# Patient Record
Sex: MALE | Race: WHITE | HISPANIC OR LATINO | URBAN - METROPOLITAN AREA
[De-identification: names, ages, dates, MRNs, and addresses within clinical notes are randomized per-mention and may not be internally consistent; named-entity substitution may affect disease eponyms.]

---

## 2024-07-18 ENCOUNTER — APPOINTMENT (OUTPATIENT)
Dept: PRIMARY CARE | Facility: CLINIC | Age: 34
End: 2024-07-18
Payer: COMMERCIAL

## 2024-07-18 VITALS
SYSTOLIC BLOOD PRESSURE: 101 MMHG | RESPIRATION RATE: 14 BRPM | TEMPERATURE: 96.6 F | BODY MASS INDEX: 14.28 KG/M2 | DIASTOLIC BLOOD PRESSURE: 66 MMHG | HEART RATE: 64 BPM | WEIGHT: 102 LBS | HEIGHT: 71 IN

## 2024-07-18 DIAGNOSIS — Z00.00 ANNUAL PHYSICAL EXAM: ICD-10-CM

## 2024-07-18 DIAGNOSIS — Z11.59 NEED FOR HEPATITIS C SCREENING TEST: ICD-10-CM

## 2024-07-18 DIAGNOSIS — Z11.3 SCREENING FOR STD (SEXUALLY TRANSMITTED DISEASE): ICD-10-CM

## 2024-07-18 DIAGNOSIS — I10 HYPERTENSION, UNSPECIFIED TYPE: Primary | ICD-10-CM

## 2024-07-18 PROCEDURE — 3008F BODY MASS INDEX DOCD: CPT

## 2024-07-18 PROCEDURE — 99395 PREV VISIT EST AGE 18-39: CPT

## 2024-07-18 PROCEDURE — 99203 OFFICE O/P NEW LOW 30 MIN: CPT

## 2024-07-18 PROCEDURE — 3074F SYST BP LT 130 MM HG: CPT

## 2024-07-18 PROCEDURE — 1036F TOBACCO NON-USER: CPT

## 2024-07-18 PROCEDURE — 3078F DIAST BP <80 MM HG: CPT

## 2024-07-18 RX ORDER — METOPROLOL SUCCINATE 25 MG/1
25 TABLET, EXTENDED RELEASE ORAL 2 TIMES DAILY
Qty: 180 TABLET | Refills: 1 | Status: SHIPPED | OUTPATIENT
Start: 2024-07-18

## 2024-07-18 RX ORDER — QUETIAPINE FUMARATE 50 MG/1
50 TABLET, FILM COATED ORAL NIGHTLY
COMMUNITY
Start: 2024-07-07

## 2024-07-18 RX ORDER — BISMUTH SUBSALICYLATE 262 MG
1 TABLET,CHEWABLE ORAL DAILY
COMMUNITY

## 2024-07-18 RX ORDER — CARBAMAZEPINE 200 MG/1
200 TABLET ORAL 3 TIMES DAILY
COMMUNITY

## 2024-07-18 RX ORDER — MIRTAZAPINE 15 MG/1
15 TABLET, FILM COATED ORAL NIGHTLY
COMMUNITY

## 2024-07-18 RX ORDER — LISINOPRIL 5 MG/1
1 TABLET ORAL
COMMUNITY
Start: 2023-09-26

## 2024-07-18 RX ORDER — THIAMINE HCL 50 MG
50 TABLET ORAL DAILY
COMMUNITY

## 2024-07-18 RX ORDER — METOPROLOL SUCCINATE 25 MG/1
25 TABLET, EXTENDED RELEASE ORAL 2 TIMES DAILY
COMMUNITY
End: 2024-07-18 | Stop reason: SDUPTHER

## 2024-07-18 SDOH — ECONOMIC STABILITY: FOOD INSECURITY: WITHIN THE PAST 12 MONTHS, THE FOOD YOU BOUGHT JUST DIDN'T LAST AND YOU DIDN'T HAVE MONEY TO GET MORE.: NEVER TRUE

## 2024-07-18 SDOH — ECONOMIC STABILITY: FOOD INSECURITY: WITHIN THE PAST 12 MONTHS, YOU WORRIED THAT YOUR FOOD WOULD RUN OUT BEFORE YOU GOT MONEY TO BUY MORE.: NEVER TRUE

## 2024-07-18 ASSESSMENT — ENCOUNTER SYMPTOMS
CONSTITUTIONAL NEGATIVE: 1
EYES NEGATIVE: 1
RESPIRATORY NEGATIVE: 1
HEMATOLOGIC/LYMPHATIC NEGATIVE: 1
PSYCHIATRIC NEGATIVE: 1
CARDIOVASCULAR NEGATIVE: 1
GASTROINTESTINAL NEGATIVE: 1
ALLERGIC/IMMUNOLOGIC NEGATIVE: 1
NEUROLOGICAL NEGATIVE: 1
ENDOCRINE NEGATIVE: 1
MUSCULOSKELETAL NEGATIVE: 1

## 2024-07-18 ASSESSMENT — PAIN SCALES - GENERAL: PAINLEVEL: 0-NO PAIN

## 2024-07-18 ASSESSMENT — PATIENT HEALTH QUESTIONNAIRE - PHQ9
2. FEELING DOWN, DEPRESSED OR HOPELESS: SEVERAL DAYS
10. IF YOU CHECKED OFF ANY PROBLEMS, HOW DIFFICULT HAVE THESE PROBLEMS MADE IT FOR YOU TO DO YOUR WORK, TAKE CARE OF THINGS AT HOME, OR GET ALONG WITH OTHER PEOPLE: NOT DIFFICULT AT ALL
SUM OF ALL RESPONSES TO PHQ9 QUESTIONS 1 & 2: 2
1. LITTLE INTEREST OR PLEASURE IN DOING THINGS: SEVERAL DAYS

## 2024-07-18 ASSESSMENT — LIFESTYLE VARIABLES
AUDIT-C TOTAL SCORE: 0
HOW MANY STANDARD DRINKS CONTAINING ALCOHOL DO YOU HAVE ON A TYPICAL DAY: PATIENT DOES NOT DRINK
SKIP TO QUESTIONS 9-10: 1
HOW OFTEN DO YOU HAVE A DRINK CONTAINING ALCOHOL: NEVER
HOW OFTEN DO YOU HAVE SIX OR MORE DRINKS ON ONE OCCASION: NEVER

## 2024-07-18 NOTE — PROGRESS NOTES
"Subjective   Patient ID: Jeremi Monroy is a 34 y.o. male who presents for New Patient Visit and Annual Exam.    HPI   Jeremi presents to establish care and for annual exam. He has no health concerns today.  Eats well and exercises most days during the week  Sees psychiatrist for any concerns of anxiety and depression.  He is originally from [place], and endorses that his health was poor for quite some time. He states that he used to drink heavily for many years and was in and out of the hospital and rehab, and states that he was even on hospice at one time. He has been sober for over 2 years and has been working to improve his health. He was following with a cardiologist when he lived in [place] and would like to become established with one here.   Routine lab work ordered during today's visit.    Review of Systems   Constitutional: Negative.    HENT: Negative.     Eyes: Negative.    Respiratory: Negative.     Cardiovascular: Negative.    Gastrointestinal: Negative.    Endocrine: Negative.    Genitourinary: Negative.    Musculoskeletal: Negative.    Skin: Negative.    Allergic/Immunologic: Negative.    Neurological: Negative.    Hematological: Negative.    Psychiatric/Behavioral: Negative.           Objective   /66 (BP Location: Right arm, Patient Position: Sitting, BP Cuff Size: Adult)   Pulse 64   Temp 35.9 °C (96.6 °F) (Temporal)   Resp 14   Ht 1.803 m (5' 11\")   Wt 46.3 kg (102 lb)   BMI 14.23 kg/m²     Physical Exam  HENT:      Right Ear: Tympanic membrane normal.      Left Ear: Tympanic membrane normal.   Cardiovascular:      Rate and Rhythm: Normal rate and regular rhythm.      Pulses: Normal pulses.      Heart sounds: Normal heart sounds.   Pulmonary:      Effort: Pulmonary effort is normal.      Breath sounds: Normal breath sounds.   Musculoskeletal:         General: Normal range of motion.   Skin:     General: Skin is warm and dry.      Capillary Refill: Capillary refill takes less than 2 " seconds.   Neurological:      Mental Status: He is oriented to person, place, and time.   Psychiatric:         Mood and Affect: Mood normal.         Behavior: Behavior normal.         Thought Content: Thought content normal.         Judgment: Judgment normal.           Assessment/Plan   Problem List Items Addressed This Visit             ICD-10-CM    Annual physical exam Z00.00    Relevant Orders    CBC    Comprehensive Metabolic Panel    TSH with reflex to Free T4 if abnormal    Lipid Panel    Hypertension - Primary I10    Relevant Medications    metoprolol succinate XL (Toprol-XL) 25 mg 24 hr tablet    Other Relevant Orders    Referral to Cardiology     Other Visit Diagnoses         Codes    Screening for STD (sexually transmitted disease)     Z11.3    Relevant Orders    HIV 1/2 Antigen/Antibody Screen with Reflex to Confirmation    Need for hepatitis C screening test     Z11.59    Relevant Orders    Hepatitis C Antibody

## 2024-07-18 NOTE — PATIENT INSTRUCTIONS
Thank you for coming to see me today.  If you have any questions or concerns following our visit, please contact the office.  Phone: [phone]    Follow up with me in 6 months    1)  Get fasting labwork completed.  The lab is down the [place] from our office, I will reach out with results    2) I have placed a referral to become established with cardiology

## 2024-07-19 ENCOUNTER — APPOINTMENT (OUTPATIENT)
Dept: PRIMARY CARE | Facility: CLINIC | Age: 34
End: 2024-07-19

## 2024-07-23 ENCOUNTER — LAB (OUTPATIENT)
Dept: LAB | Facility: LAB | Age: 34
End: 2024-07-23
Payer: COMMERCIAL

## 2024-07-23 ENCOUNTER — TELEPHONE (OUTPATIENT)
Dept: PRIMARY CARE | Facility: CLINIC | Age: 34
End: 2024-07-23

## 2024-07-23 DIAGNOSIS — D64.9 NORMOCYTIC ANEMIA: ICD-10-CM

## 2024-07-23 DIAGNOSIS — Z11.59 NEED FOR HEPATITIS C SCREENING TEST: ICD-10-CM

## 2024-07-23 DIAGNOSIS — Z11.3 SCREENING FOR STD (SEXUALLY TRANSMITTED DISEASE): ICD-10-CM

## 2024-07-23 DIAGNOSIS — E87.1 HYPONATREMIA: Primary | ICD-10-CM

## 2024-07-23 DIAGNOSIS — Z00.00 ANNUAL PHYSICAL EXAM: ICD-10-CM

## 2024-07-23 LAB
ALBUMIN SERPL BCP-MCNC: 5 G/DL (ref 3.4–5)
ALP SERPL-CCNC: 118 U/L (ref 33–120)
ALT SERPL W P-5'-P-CCNC: 29 U/L (ref 10–52)
ANION GAP SERPL CALC-SCNC: 10 MMOL/L (ref 10–20)
AST SERPL W P-5'-P-CCNC: 22 U/L (ref 9–39)
BILIRUB SERPL-MCNC: 0.6 MG/DL (ref 0–1.2)
BUN SERPL-MCNC: 12 MG/DL (ref 6–23)
CALCIUM SERPL-MCNC: 9.5 MG/DL (ref 8.6–10.3)
CHLORIDE SERPL-SCNC: 97 MMOL/L (ref 98–107)
CHOLEST SERPL-MCNC: 74 MG/DL (ref 0–199)
CHOLESTEROL/HDL RATIO: 2.6
CO2 SERPL-SCNC: 27 MMOL/L (ref 21–32)
CREAT SERPL-MCNC: 0.65 MG/DL (ref 0.5–1.3)
EGFRCR SERPLBLD CKD-EPI 2021: >90 ML/MIN/1.73M*2
ERYTHROCYTE [DISTWIDTH] IN BLOOD BY AUTOMATED COUNT: 13.5 % (ref 11.5–14.5)
GLUCOSE SERPL-MCNC: 75 MG/DL (ref 74–99)
HCT VFR BLD AUTO: 34.3 % (ref 41–52)
HCV AB SER QL: NONREACTIVE
HDLC SERPL-MCNC: 28.9 MG/DL
HGB BLD-MCNC: 11.6 G/DL (ref 13.5–17.5)
HIV 1+2 AB+HIV1 P24 AG SERPL QL IA: NONREACTIVE
IRON SATN MFR SERPL: 27 % (ref 25–45)
IRON SERPL-MCNC: 94 UG/DL (ref 35–150)
LDLC SERPL CALC-MCNC: 37 MG/DL
MCH RBC QN AUTO: 32.7 PG (ref 26–34)
MCHC RBC AUTO-ENTMCNC: 33.8 G/DL (ref 32–36)
MCV RBC AUTO: 97 FL (ref 80–100)
NON HDL CHOLESTEROL: 45 MG/DL (ref 0–149)
NRBC BLD-RTO: 0 /100 WBCS (ref 0–0)
PLATELET # BLD AUTO: 156 X10*3/UL (ref 150–450)
POTASSIUM SERPL-SCNC: 4.1 MMOL/L (ref 3.5–5.3)
PROT SERPL-MCNC: 8.1 G/DL (ref 6.4–8.2)
RBC # BLD AUTO: 3.55 X10*6/UL (ref 4.5–5.9)
SODIUM SERPL-SCNC: 130 MMOL/L (ref 136–145)
TIBC SERPL-MCNC: 352 UG/DL (ref 240–445)
TRIGL SERPL-MCNC: 42 MG/DL (ref 0–149)
TSH SERPL-ACNC: 3.14 MIU/L (ref 0.44–3.98)
UIBC SERPL-MCNC: 258 UG/DL (ref 110–370)
VLDL: 8 MG/DL (ref 0–40)
WBC # BLD AUTO: 2.1 X10*3/UL (ref 4.4–11.3)

## 2024-07-23 PROCEDURE — 83540 ASSAY OF IRON: CPT

## 2024-07-23 PROCEDURE — 83550 IRON BINDING TEST: CPT

## 2024-07-23 PROCEDURE — 80061 LIPID PANEL: CPT

## 2024-07-23 PROCEDURE — 84443 ASSAY THYROID STIM HORMONE: CPT

## 2024-07-23 PROCEDURE — 36415 COLL VENOUS BLD VENIPUNCTURE: CPT

## 2024-07-23 PROCEDURE — 86803 HEPATITIS C AB TEST: CPT

## 2024-07-23 PROCEDURE — 85027 COMPLETE CBC AUTOMATED: CPT

## 2024-07-23 PROCEDURE — 87389 HIV-1 AG W/HIV-1&-2 AB AG IA: CPT

## 2024-07-23 PROCEDURE — 80053 COMPREHEN METABOLIC PANEL: CPT

## 2024-07-23 NOTE — TELEPHONE ENCOUNTER
KV is out of the office this week so patient's results were forwarded to me. Sodium level is very low. I recommend rechecking on Monday, does not need to fast. Also white blood cell count and hemoglobin were low. I will recheck those as well.  Thanks,  Brit Soares, DO

## 2024-07-23 NOTE — TELEPHONE ENCOUNTER
----- Message from Rosalie Keene sent at 7/23/2024  3:45 PM EDT -----  Regarding: Abnormal Lab Results  Please review these lab results for one of Nancy's patients and advise if it is okay to wait for her to address them when she returns next week.  ----- Message -----  From: Lab, Background User  Sent: 7/23/2024  11:11 AM EDT  To: MICHAEL Yusuf-CNP

## 2024-07-29 ENCOUNTER — LAB (OUTPATIENT)
Dept: LAB | Facility: LAB | Age: 34
End: 2024-07-29
Payer: COMMERCIAL

## 2024-07-29 DIAGNOSIS — E87.1 HYPONATREMIA: ICD-10-CM

## 2024-07-29 DIAGNOSIS — D64.9 NORMOCYTIC ANEMIA: ICD-10-CM

## 2024-07-29 LAB
ANION GAP SERPL CALC-SCNC: 11 MMOL/L (ref 10–20)
BASOPHILS # BLD AUTO: 0.01 X10*3/UL (ref 0–0.1)
BASOPHILS NFR BLD AUTO: 0.4 %
BUN SERPL-MCNC: 13 MG/DL (ref 6–23)
CALCIUM SERPL-MCNC: 8.7 MG/DL (ref 8.6–10.3)
CHLORIDE SERPL-SCNC: 92 MMOL/L (ref 98–107)
CO2 SERPL-SCNC: 28 MMOL/L (ref 21–32)
CREAT SERPL-MCNC: 0.56 MG/DL (ref 0.5–1.3)
EGFRCR SERPLBLD CKD-EPI 2021: >90 ML/MIN/1.73M*2
EOSINOPHIL # BLD AUTO: 0.06 X10*3/UL (ref 0–0.7)
EOSINOPHIL NFR BLD AUTO: 2.1 %
ERYTHROCYTE [DISTWIDTH] IN BLOOD BY AUTOMATED COUNT: 13.4 % (ref 11.5–14.5)
GLUCOSE SERPL-MCNC: 58 MG/DL (ref 74–99)
HCT VFR BLD AUTO: 30 % (ref 41–52)
HGB BLD-MCNC: 10.2 G/DL (ref 13.5–17.5)
IMM GRANULOCYTES # BLD AUTO: 0.01 X10*3/UL (ref 0–0.7)
IMM GRANULOCYTES NFR BLD AUTO: 0.4 % (ref 0–0.9)
LYMPHOCYTES # BLD AUTO: 0.56 X10*3/UL (ref 1.2–4.8)
LYMPHOCYTES NFR BLD AUTO: 20 %
MCH RBC QN AUTO: 33.2 PG (ref 26–34)
MCHC RBC AUTO-ENTMCNC: 34 G/DL (ref 32–36)
MCV RBC AUTO: 98 FL (ref 80–100)
MONOCYTES # BLD AUTO: 0.44 X10*3/UL (ref 0.1–1)
MONOCYTES NFR BLD AUTO: 15.7 %
NEUTROPHILS # BLD AUTO: 1.72 X10*3/UL (ref 1.2–7.7)
NEUTROPHILS NFR BLD AUTO: 61.4 %
NRBC BLD-RTO: 0 /100 WBCS (ref 0–0)
OSMOLALITY SERPL: 267 MOSM/KG (ref 280–300)
PLATELET # BLD AUTO: 145 X10*3/UL (ref 150–450)
POTASSIUM SERPL-SCNC: 3.8 MMOL/L (ref 3.5–5.3)
RBC # BLD AUTO: 3.07 X10*6/UL (ref 4.5–5.9)
SODIUM SERPL-SCNC: 127 MMOL/L (ref 136–145)
WBC # BLD AUTO: 2.8 X10*3/UL (ref 4.4–11.3)

## 2024-07-29 PROCEDURE — 80048 BASIC METABOLIC PNL TOTAL CA: CPT

## 2024-07-29 PROCEDURE — 36415 COLL VENOUS BLD VENIPUNCTURE: CPT

## 2024-07-29 PROCEDURE — 83930 ASSAY OF BLOOD OSMOLALITY: CPT

## 2024-07-29 PROCEDURE — 85025 COMPLETE CBC W/AUTO DIFF WBC: CPT

## 2024-09-20 ENCOUNTER — APPOINTMENT (OUTPATIENT)
Dept: CARDIOLOGY | Facility: HOSPITAL | Age: 34
End: 2024-09-20
Payer: COMMERCIAL

## 2024-09-20 VITALS
WEIGHT: 99 LBS | SYSTOLIC BLOOD PRESSURE: 99 MMHG | HEIGHT: 71 IN | HEART RATE: 60 BPM | DIASTOLIC BLOOD PRESSURE: 60 MMHG | BODY MASS INDEX: 13.86 KG/M2

## 2024-09-20 DIAGNOSIS — R79.89 ABNORMAL LIVER FUNCTION TEST: ICD-10-CM

## 2024-09-20 DIAGNOSIS — E87.1 HYPONATREMIA: ICD-10-CM

## 2024-09-20 DIAGNOSIS — I42.6 ALCOHOLIC CARDIOMYOPATHY (MULTI): Primary | ICD-10-CM

## 2024-09-20 DIAGNOSIS — I10 HYPERTENSION, UNSPECIFIED TYPE: ICD-10-CM

## 2024-09-20 PROCEDURE — 3074F SYST BP LT 130 MM HG: CPT | Performed by: INTERNAL MEDICINE

## 2024-09-20 PROCEDURE — 93010 ELECTROCARDIOGRAM REPORT: CPT | Performed by: INTERNAL MEDICINE

## 2024-09-20 PROCEDURE — 3078F DIAST BP <80 MM HG: CPT | Performed by: INTERNAL MEDICINE

## 2024-09-20 PROCEDURE — 99214 OFFICE O/P EST MOD 30 MIN: CPT | Performed by: INTERNAL MEDICINE

## 2024-09-20 PROCEDURE — 93005 ELECTROCARDIOGRAM TRACING: CPT | Performed by: INTERNAL MEDICINE

## 2024-09-20 PROCEDURE — 3008F BODY MASS INDEX DOCD: CPT | Performed by: INTERNAL MEDICINE

## 2024-09-20 PROCEDURE — 1036F TOBACCO NON-USER: CPT | Performed by: INTERNAL MEDICINE

## 2024-09-20 PROCEDURE — 99204 OFFICE O/P NEW MOD 45 MIN: CPT | Performed by: INTERNAL MEDICINE

## 2024-09-20 RX ORDER — LISINOPRIL 5 MG/1
5 TABLET ORAL
Qty: 90 TABLET | Refills: 1 | Status: SHIPPED | OUTPATIENT
Start: 2024-09-20

## 2024-09-20 RX ORDER — METOPROLOL SUCCINATE 25 MG/1
25 TABLET, EXTENDED RELEASE ORAL 2 TIMES DAILY
Qty: 180 TABLET | Refills: 3 | Status: SHIPPED | OUTPATIENT
Start: 2024-09-20

## 2024-09-20 ASSESSMENT — ENCOUNTER SYMPTOMS
OCCASIONAL FEELINGS OF UNSTEADINESS: 0
LOSS OF SENSATION IN FEET: 0
DEPRESSION: 0

## 2024-09-20 NOTE — PROGRESS NOTES
"Chief Complaint:   New Patient Visit (Wants to est.)     History Of Present Illness:    Jeremi Monroy is a 34 y.o. male presenting to establish care.    He has a past medical history of heavy alcohol use currently in remission for 2 years.  He states that back [place] he was in [place] he had a lot of anxiety and depression and was drinking alcohol heavily would have recurrent seizures, had severe weight loss, weighing only 70 pounds at that time complicated by kidney failure, possibly liver problems as well and congestive heart failure.  However he has more control over his health in the last couple of years and has abstained from alcohol has regained 20 pounds and appetite.  Is functioning very well at this time currently employed as an , happily  and feels well.  The only issues at present are low sodium levels, low BMI and pancytopenia for which he is also planning to follow-up with a hepatologist.  His liver enzyme numbers have improved protein levels are normal but platelet count and other hematologic indicis remain low.    He tells me he has some scarring of the liver but uncertain how much liver damage was done.    He states that his heart function improved after he quit alcohol and with medical therapy.  He has been maintained on lisinopril and metoprolol chronically since.    Today his ECG shows normal sinus rhythm with incomplete right bundle branch block and is within normal limits otherwise..     Last Recorded Vitals:  Vitals:    09/20/24 0933   BP: 99/60   BP Location: Left arm   Pulse: 60   Weight: (!) 44.9 kg (99 lb)   Height: 1.803 m (5' 11\")       Past Medical History:  He has a past medical history of History of ETOH abuse and History of hypertension.    Past Surgical History:  He has no past surgical history on file.      Social History:  He reports that he has never smoked. He has never used smokeless tobacco. He reports that he does not currently use alcohol. He reports that " he does not use drugs.    Family History:  Family History   Problem Relation Name Age of Onset    No Known Problems Mother      Hypertension Father          Allergies:  Patient has no known allergies.    Outpatient Medications:  Current Outpatient Medications   Medication Instructions    carBAMazepine (TEGRETOL) 200 mg, oral, 3 times daily    lisinopril 5 mg, oral, Daily (0630)    metoprolol succinate XL (TOPROL-XL) 25 mg, oral, 2 times daily    mirtazapine (REMERON) 15 mg, oral, Nightly    multivitamin tablet 1 tablet, oral, Daily    QUEtiapine (SEROQUEL) 50 mg, oral, Nightly    thiamine (VITAMIN B-1) 50 mg, oral, Daily       Physical Exam:  Physical Exam  Vitals reviewed.   Constitutional:       Appearance: He is well-groomed and underweight.   Neck:      Vascular: No carotid bruit or JVD.   Cardiovascular:      Rate and Rhythm: Normal rate and regular rhythm.      Heart sounds: Normal heart sounds, S1 normal and S2 normal. No murmur heard.  Pulmonary:      Effort: Pulmonary effort is normal.      Breath sounds: Normal breath sounds.   Abdominal:      General: Abdomen is flat. Bowel sounds are normal.      Palpations: Abdomen is soft.   Musculoskeletal:      Right lower leg: No edema.      Left lower leg: No edema.   Skin:     General: Skin is warm.   Neurological:      Mental Status: He is alert. Mental status is at baseline.   Psychiatric:         Mood and Affect: Mood normal.         Behavior: Behavior normal.           Last Labs:  CBC -  Lab Results   Component Value Date    WBC 2.8 (L) 07/29/2024    HGB 10.2 (L) 07/29/2024    HCT 30.0 (L) 07/29/2024    MCV 98 07/29/2024     (L) 07/29/2024       CMP -  Lab Results   Component Value Date    CALCIUM 8.7 07/29/2024    PROT 8.1 07/23/2024    ALBUMIN 5.0 07/23/2024    AST 22 07/23/2024    ALT 29 07/23/2024    ALKPHOS 118 07/23/2024    BILITOT 0.6 07/23/2024       LIPID PANEL -   Lab Results   Component Value Date    CHOL 74 07/23/2024    TRIG 42 07/23/2024  "   HDL 28.9 07/23/2024    CHHDL 2.6 07/23/2024    VLDL 8 07/23/2024    NHDL 45 07/23/2024       RENAL FUNCTION PANEL -   Lab Results   Component Value Date    GLUCOSE 58 (L) 07/29/2024     (L) 07/29/2024    K 3.8 07/29/2024    CL 92 (L) 07/29/2024    CO2 28 07/29/2024    ANIONGAP 11 07/29/2024    BUN 13 07/29/2024    CREATININE 0.56 07/29/2024    GFRMALE >90 04/19/2022    CALCIUM 8.7 07/29/2024    ALBUMIN 5.0 07/23/2024        No results found for: \"BNP\", \"HGBA1C\"    Last Cardiology Tests:  ECG:  No results found for this or any previous visit from the past 1095 days.      Echo:  No results found for this or any previous visit from the past 1095 days.      Ejection Fractions:  No results found for: \"EF\"    Cath:  No results found for this or any previous visit from the past 1095 days.      Stress Test:  No results found for this or any previous visit from the past 1095 days.      Cardiac Imaging:  No results found for this or any previous visit from the past 1095 days.          Assessment/Plan   1-history of alcoholic cardiomyopathy-likely with recovered LV function after remission from alcohol use.  Continue ACE inhibitors and metoprolol.  Follow-up on echo cardiogram.    Will plan on obtaining records from [place].  Follow-up in 1 year or earlier if needed.    2-prior history of liver damage and abnormal liver enzymes-follow-up with hepatologist for evaluation, will refer.      Yajaira Ellsworth MD  "

## 2024-10-07 ENCOUNTER — HOSPITAL ENCOUNTER (OUTPATIENT)
Dept: CARDIOLOGY | Facility: HOSPITAL | Age: 34
Discharge: HOME | End: 2024-10-07
Payer: COMMERCIAL

## 2024-10-07 DIAGNOSIS — I42.6 ALCOHOLIC CARDIOMYOPATHY (MULTI): ICD-10-CM

## 2024-10-07 LAB
AORTIC VALVE MEAN GRADIENT: 1 MMHG
AORTIC VALVE PEAK VELOCITY: 0.57 M/S
AV PEAK GRADIENT: 1.3 MMHG
AVA (PEAK VEL): 2.63 CM2
AVA (VTI): 2.62 CM2
EJECTION FRACTION: 58 %
LEFT VENTRICLE INTERNAL DIMENSION DIASTOLE: 4.4 CM (ref 3.5–6)
LEFT VENTRICULAR OUTFLOW TRACT DIAMETER: 2.3 CM
MITRAL VALVE E/A RATIO: 1.55
MITRAL VALVE E/E' RATIO: 3.7
RIGHT VENTRICLE FREE WALL PEAK S': 11.6 CM/S
RIGHT VENTRICLE PEAK SYSTOLIC PRESSURE: 12.2 MMHG
TRICUSPID ANNULAR PLANE SYSTOLIC EXCURSION: 2.6 CM

## 2024-10-07 PROCEDURE — 93306 TTE W/DOPPLER COMPLETE: CPT | Performed by: INTERNAL MEDICINE

## 2024-10-07 PROCEDURE — 93306 TTE W/DOPPLER COMPLETE: CPT

## 2024-10-08 ENCOUNTER — TELEPHONE (OUTPATIENT)
Dept: CARDIOLOGY | Facility: HOSPITAL | Age: 34
End: 2024-10-08
Payer: COMMERCIAL

## 2024-10-08 NOTE — TELEPHONE ENCOUNTER
----- Message from Yajaira Ellsworth sent at 10/7/2024  2:54 PM EDT -----  Results reviewed. No critical results, follow up as usual to discuss in details.

## 2024-10-08 NOTE — TELEPHONE ENCOUNTER
Results have been viewed on his MyChart.  Echo looked good.  Called to see if he had any questions.  He does not.  He is happy with results and will follow up next year as scheduled.

## 2024-11-23 ENCOUNTER — OFFICE VISIT (OUTPATIENT)
Dept: URGENT CARE | Age: 34
End: 2024-11-23
Payer: COMMERCIAL

## 2024-11-23 VITALS
DIASTOLIC BLOOD PRESSURE: 67 MMHG | RESPIRATION RATE: 16 BRPM | TEMPERATURE: 97.4 F | SYSTOLIC BLOOD PRESSURE: 99 MMHG | HEART RATE: 68 BPM

## 2024-11-23 DIAGNOSIS — J01.90 ACUTE NON-RECURRENT SINUSITIS, UNSPECIFIED LOCATION: Primary | ICD-10-CM

## 2024-11-23 RX ORDER — AMOXICILLIN AND CLAVULANATE POTASSIUM 875; 125 MG/1; MG/1
1 TABLET, FILM COATED ORAL 2 TIMES DAILY
Qty: 20 TABLET | Refills: 0 | Status: SHIPPED | OUTPATIENT
Start: 2024-11-23 | End: 2024-12-03

## 2024-11-23 ASSESSMENT — ENCOUNTER SYMPTOMS
SHORTNESS OF BREATH: 0
WHEEZING: 0
COUGH: 1
CHEST TIGHTNESS: 0
SORE THROAT: 1
CHILLS: 0
RHINORRHEA: 1
SINUS PRESSURE: 1
FEVER: 0

## 2024-11-23 NOTE — PROGRESS NOTES
Subjective   Patient ID: Jeremi Monroy is a 34 y.o. male. They present today with a chief complaint of Sore Throat and Cough (3 weeks).    History of Present Illness  Patient presents with a 3 week history of upper respiratory symptoms including sore throat, runny nose, congestion, sinus pressure, cough. He denies ear pain, fevers, chills, SOB, chest tightness, wheezing. He has tried OTC Theraflu without relief.         Past Medical History  Allergies as of 11/23/2024   • (No Known Allergies)       (Not in a hospital admission)       Past Medical History:   Diagnosis Date   • History of ETOH abuse    • History of hypertension        No past surgical history on file.     reports that he has never smoked. He has never used smokeless tobacco. He reports that he does not currently use alcohol. He reports that he does not use drugs.    Review of Systems  Review of Systems   Constitutional:  Negative for chills and fever.   HENT:  Positive for congestion, postnasal drip, rhinorrhea, sinus pressure and sore throat. Negative for ear pain.    Respiratory:  Positive for cough. Negative for chest tightness, shortness of breath and wheezing.                                   Objective    There were no vitals filed for this visit.  No LMP for male patient.    Physical Exam  Constitutional:       General: He is not in acute distress.     Appearance: Normal appearance. He is not ill-appearing.   HENT:      Right Ear: Tympanic membrane, ear canal and external ear normal.      Left Ear: Tympanic membrane, ear canal and external ear normal.      Nose: Congestion and rhinorrhea present. Rhinorrhea is purulent.      Right Sinus: Maxillary sinus tenderness present.      Left Sinus: Maxillary sinus tenderness present.      Mouth/Throat:      Pharynx: No pharyngeal swelling, oropharyngeal exudate or posterior oropharyngeal erythema.      Tonsils: No tonsillar exudate or tonsillar abscesses.   Eyes:      General:         Right eye: No  discharge.         Left eye: No discharge.      Conjunctiva/sclera: Conjunctivae normal.   Cardiovascular:      Rate and Rhythm: Normal rate and regular rhythm.      Pulses: Normal pulses.      Heart sounds: Normal heart sounds.   Pulmonary:      Effort: Pulmonary effort is normal. No respiratory distress.      Breath sounds: Normal breath sounds. No wheezing, rhonchi or rales.   Lymphadenopathy:      Cervical: Cervical adenopathy present.   Neurological:      Mental Status: He is alert and oriented to person, place, and time.       Procedures    Point of Care Test & Imaging Results from this visit  No results found for this visit on 11/23/24.   No results found.    Diagnostic study results (if any) were reviewed by [facility].    Assessment/Plan   Allergies, medications, history, and pertinent labs/EKGs/Imaging reviewed by Liyah Marie PA-C.     Medical Decision Making  Patient presents with signs and symptoms consistent with sinusitis. Given length of symptoms and lack of improvement with OTC symptomatic treatment, it seemed reasonable to treat with course of antibiotics. Discussed may continue with OTC symptomatic relief. Discussed to follow up with PCP if symptoms are not improving over the next 3-5 days, earlier with any acute worsening.      Orders and Diagnoses  There are no diagnoses linked to this encounter.    Medical Admin Record      Patient disposition: Home    Electronically signed by [facility]  8:17 AM

## 2024-12-30 ENCOUNTER — TELEPHONE (OUTPATIENT)
Dept: PRIMARY CARE | Facility: CLINIC | Age: 34
End: 2024-12-30
Payer: COMMERCIAL

## 2024-12-30 ENCOUNTER — TELEPHONE (OUTPATIENT)
Dept: HEMATOLOGY/ONCOLOGY | Facility: CLINIC | Age: 34
End: 2024-12-30
Payer: COMMERCIAL

## 2024-12-30 DIAGNOSIS — Z00.00 HEALTHCARE MAINTENANCE: Primary | ICD-10-CM

## 2024-12-30 NOTE — TELEPHONE ENCOUNTER
Just wanted to make sure the order sfor his 1/13/25 appt w/ [facility] will be in a week prior to the visit. He'd like to get them drawn/done before the appt.

## 2025-01-02 ENCOUNTER — LAB (OUTPATIENT)
Dept: LAB | Facility: LAB | Age: 35
End: 2025-01-02
Payer: COMMERCIAL

## 2025-01-02 ENCOUNTER — TELEPHONE (OUTPATIENT)
Dept: PRIMARY CARE | Facility: CLINIC | Age: 35
End: 2025-01-02

## 2025-01-02 DIAGNOSIS — Z00.00 HEALTHCARE MAINTENANCE: ICD-10-CM

## 2025-01-02 LAB
25(OH)D3 SERPL-MCNC: 34 NG/ML (ref 30–100)
ALBUMIN SERPL BCP-MCNC: 3.8 G/DL (ref 3.4–5)
ALP SERPL-CCNC: 126 U/L (ref 33–120)
ALT SERPL W P-5'-P-CCNC: 23 U/L (ref 10–52)
ANION GAP SERPL CALC-SCNC: 10 MMOL/L
AST SERPL W P-5'-P-CCNC: 22 U/L (ref 9–39)
BILIRUB SERPL-MCNC: 0.3 MG/DL (ref 0–1.2)
BUN SERPL-MCNC: 15 MG/DL (ref 6–23)
CALCIUM SERPL-MCNC: 8.2 MG/DL (ref 8.6–10.3)
CHLORIDE SERPL-SCNC: 95 MMOL/L (ref 98–107)
CHOLEST SERPL-MCNC: 64 MG/DL (ref 0–199)
CHOLESTEROL/HDL RATIO: 2.4
CO2 SERPL-SCNC: 27 MMOL/L (ref 21–32)
CREAT SERPL-MCNC: 0.47 MG/DL (ref 0.5–1.3)
EGFRCR SERPLBLD CKD-EPI 2021: >90 ML/MIN/1.73M*2
ERYTHROCYTE [DISTWIDTH] IN BLOOD BY AUTOMATED COUNT: 13.1 % (ref 11.5–14.5)
EST. AVERAGE GLUCOSE BLD GHB EST-MCNC: 100 MG/DL
GLUCOSE SERPL-MCNC: 83 MG/DL (ref 74–99)
HBA1C MFR BLD: 5.1 %
HCT VFR BLD AUTO: 19.4 % (ref 41–52)
HDLC SERPL-MCNC: 26.3 MG/DL
HGB BLD-MCNC: 6.5 G/DL (ref 13.5–17.5)
IRON SATN MFR SERPL: 11 % (ref 25–45)
IRON SERPL-MCNC: 30 UG/DL (ref 35–150)
LDLC SERPL CALC-MCNC: 28 MG/DL
MCH RBC QN AUTO: 34.8 PG (ref 26–34)
MCHC RBC AUTO-ENTMCNC: 33.5 G/DL (ref 32–36)
MCV RBC AUTO: 104 FL (ref 80–100)
NON HDL CHOLESTEROL: 38 MG/DL (ref 0–149)
NRBC BLD-RTO: 0 /100 WBCS (ref 0–0)
PLATELET # BLD AUTO: 173 X10*3/UL (ref 150–450)
POTASSIUM SERPL-SCNC: 3.9 MMOL/L (ref 3.5–5.3)
PROT SERPL-MCNC: 6.1 G/DL (ref 6.4–8.2)
RBC # BLD AUTO: 1.87 X10*6/UL (ref 4.5–5.9)
SODIUM SERPL-SCNC: 128 MMOL/L (ref 136–145)
TIBC SERPL-MCNC: 279 UG/DL (ref 240–445)
TRIGL SERPL-MCNC: 48 MG/DL (ref 0–149)
TSH SERPL-ACNC: 1.92 MIU/L (ref 0.44–3.98)
UIBC SERPL-MCNC: 249 UG/DL (ref 110–370)
VLDL: 10 MG/DL (ref 0–40)
WBC # BLD AUTO: 5 X10*3/UL (ref 4.4–11.3)

## 2025-01-02 PROCEDURE — 80053 COMPREHEN METABOLIC PANEL: CPT

## 2025-01-02 PROCEDURE — 83036 HEMOGLOBIN GLYCOSYLATED A1C: CPT

## 2025-01-02 NOTE — TELEPHONE ENCOUNTER
LAB called with urgent result of Hemoglobin 6.5.    I called and spoke with this Nancy patient and gave him his critical lab result of HEMOGLOBIN: 6.5. I advised him to go to the ER chacorta AUGUSTE, but he states that he felt lightheaded a few days ago when he had the blood drawn but feels ok now. He states that he is eating iron rich foods like red meat and beans and would like to treat this through dietary changes. I let him know that the result was low enough to require a blood transfusion but he still declined. He said that he would strongly consider going to the ER if he starts to feel bad again. I advised him to go to the ER one more time and reiterated the seriousness of the issue , as this could be caused by internal bleeding or other serous problems but he said he understood but wanted to keep an eye on the situation.    This information was forwarded to  Tiffany Keene.

## 2025-01-02 NOTE — TELEPHONE ENCOUNTER
Patient phoned again with questions regarding his conversation with Mike.  He wanted to know if he could schedule a blood transfusion and I told him that he would need to go the ER for an evaluation.  He undertow and had no other questions.

## 2025-01-03 ENCOUNTER — APPOINTMENT (OUTPATIENT)
Dept: RADIOLOGY | Facility: HOSPITAL | Age: 35
End: 2025-01-03
Payer: COMMERCIAL

## 2025-01-03 ENCOUNTER — HOSPITAL ENCOUNTER (EMERGENCY)
Facility: HOSPITAL | Age: 35
Discharge: LEFT BEFORE DISCHARGE | End: 2025-01-03
Attending: STUDENT IN AN ORGANIZED HEALTH CARE EDUCATION/TRAINING PROGRAM
Payer: COMMERCIAL

## 2025-01-03 ENCOUNTER — APPOINTMENT (OUTPATIENT)
Dept: CARDIOLOGY | Facility: HOSPITAL | Age: 35
End: 2025-01-03
Payer: COMMERCIAL

## 2025-01-03 ENCOUNTER — PHARMACY VISIT (OUTPATIENT)
Dept: PHARMACY | Facility: CLINIC | Age: 35
End: 2025-01-03
Payer: MEDICARE

## 2025-01-03 VITALS
BODY MASS INDEX: 14 KG/M2 | TEMPERATURE: 97.7 F | OXYGEN SATURATION: 100 % | WEIGHT: 100 LBS | RESPIRATION RATE: 14 BRPM | SYSTOLIC BLOOD PRESSURE: 105 MMHG | HEIGHT: 71 IN | HEART RATE: 57 BPM | DIASTOLIC BLOOD PRESSURE: 70 MMHG

## 2025-01-03 DIAGNOSIS — E87.1 HYPONATREMIA: ICD-10-CM

## 2025-01-03 DIAGNOSIS — D64.9 ANEMIA, UNSPECIFIED TYPE: ICD-10-CM

## 2025-01-03 DIAGNOSIS — K29.20 ACUTE ALCOHOLIC GASTRITIS, PRESENCE OF BLEEDING UNSPECIFIED: Primary | ICD-10-CM

## 2025-01-03 LAB
ABO GROUP (TYPE) IN BLOOD: NORMAL
ABO GROUP (TYPE) IN BLOOD: NORMAL
ACANTHOCYTES BLD QL SMEAR: NORMAL
ALBUMIN SERPL BCP-MCNC: 3.8 G/DL (ref 3.4–5)
ALP SERPL-CCNC: 133 U/L (ref 33–120)
ALT SERPL W P-5'-P-CCNC: 25 U/L (ref 10–52)
ANION GAP SERPL CALC-SCNC: 12 MMOL/L (ref 10–20)
ANTIBODY SCREEN: NORMAL
AST SERPL W P-5'-P-CCNC: 26 U/L (ref 9–39)
ATRIAL RATE: 67 BPM
BASOPHILS # BLD AUTO: 0.02 X10*3/UL (ref 0–0.1)
BASOPHILS NFR BLD AUTO: 0.3 %
BILIRUB SERPL-MCNC: 0.3 MG/DL (ref 0–1.2)
BLOOD EXPIRATION DATE: NORMAL
BUN SERPL-MCNC: 13 MG/DL (ref 6–23)
CALCIUM SERPL-MCNC: 8.4 MG/DL (ref 8.6–10.3)
CARDIAC TROPONIN I PNL SERPL HS: 3 NG/L (ref 0–20)
CARDIAC TROPONIN I PNL SERPL HS: <3 NG/L (ref 0–20)
CHLORIDE SERPL-SCNC: 95 MMOL/L (ref 98–107)
CO2 SERPL-SCNC: 25 MMOL/L (ref 21–32)
CREAT SERPL-MCNC: 0.53 MG/DL (ref 0.5–1.3)
DISPENSE STATUS: NORMAL
EGFRCR SERPLBLD CKD-EPI 2021: >90 ML/MIN/1.73M*2
EOSINOPHIL # BLD AUTO: 0.02 X10*3/UL (ref 0–0.7)
EOSINOPHIL NFR BLD AUTO: 0.3 %
ERYTHROCYTE [DISTWIDTH] IN BLOOD BY AUTOMATED COUNT: 13.2 % (ref 11.5–14.5)
GLUCOSE SERPL-MCNC: 82 MG/DL (ref 74–99)
HCT VFR BLD AUTO: 19.6 % (ref 41–52)
HCT VFR BLD AUTO: 19.6 % (ref 41–52)
HGB BLD-MCNC: 6.6 G/DL (ref 13.5–17.5)
HGB BLD-MCNC: 6.7 G/DL (ref 13.5–17.5)
HYPOCHROMIA BLD QL SMEAR: NORMAL
IMM GRANULOCYTES # BLD AUTO: 0.03 X10*3/UL (ref 0–0.7)
IMM GRANULOCYTES NFR BLD AUTO: 0.5 % (ref 0–0.9)
INR PPP: 1.2 (ref 0.9–1.1)
LIPASE SERPL-CCNC: 32 U/L (ref 9–82)
LYMPHOCYTES # BLD AUTO: 0.51 X10*3/UL (ref 1.2–4.8)
LYMPHOCYTES NFR BLD AUTO: 8.8 %
MCH RBC QN AUTO: 34.9 PG (ref 26–34)
MCHC RBC AUTO-ENTMCNC: 34.2 G/DL (ref 32–36)
MCV RBC AUTO: 102 FL (ref 80–100)
MONOCYTES # BLD AUTO: 0.53 X10*3/UL (ref 0.1–1)
MONOCYTES NFR BLD AUTO: 9.2 %
NEUTROPHILS # BLD AUTO: 4.67 X10*3/UL (ref 1.2–7.7)
NEUTROPHILS NFR BLD AUTO: 80.9 %
NRBC BLD-RTO: 0 /100 WBCS (ref 0–0)
P AXIS: 76 DEGREES
PLATELET # BLD AUTO: 189 X10*3/UL (ref 150–450)
POLYCHROMASIA BLD QL SMEAR: NORMAL
POTASSIUM SERPL-SCNC: 4.2 MMOL/L (ref 3.5–5.3)
PR INTERVAL: 163 MS
PRODUCT BLOOD TYPE: 5100
PRODUCT CODE: NORMAL
PROT SERPL-MCNC: 6.7 G/DL (ref 6.4–8.2)
PROTHROMBIN TIME: 13.4 SECONDS (ref 9.8–12.8)
Q ONSET: 253 MS
QRS COUNT: 11 BEATS
QRS DURATION: 101 MS
QT INTERVAL: 350 MS
QTC CALCULATION(BAZETT): 370 MS
QTC FREDERICIA: 363 MS
R AXIS: 60 DEGREES
RBC # BLD AUTO: 1.92 X10*6/UL (ref 4.5–5.9)
RBC MORPH BLD: NORMAL
RH FACTOR (ANTIGEN D): NORMAL
RH FACTOR (ANTIGEN D): NORMAL
SODIUM SERPL-SCNC: 128 MMOL/L (ref 136–145)
T AXIS: 63 DEGREES
T OFFSET: 428 MS
UNIT ABO: NORMAL
UNIT NUMBER: NORMAL
UNIT RH: NORMAL
UNIT VOLUME: 350
VENTRICULAR RATE: 67 BPM
WBC # BLD AUTO: 5.8 X10*3/UL (ref 4.4–11.3)
XM INTEP: NORMAL

## 2025-01-03 PROCEDURE — 86923 COMPATIBILITY TEST ELECTRIC: CPT

## 2025-01-03 PROCEDURE — 36415 COLL VENOUS BLD VENIPUNCTURE: CPT | Performed by: NURSE PRACTITIONER

## 2025-01-03 PROCEDURE — 85610 PROTHROMBIN TIME: CPT | Performed by: NURSE PRACTITIONER

## 2025-01-03 PROCEDURE — 2500000004 HC RX 250 GENERAL PHARMACY W/ HCPCS (ALT 636 FOR OP/ED): Performed by: NURSE PRACTITIONER

## 2025-01-03 PROCEDURE — 80053 COMPREHEN METABOLIC PANEL: CPT | Performed by: NURSE PRACTITIONER

## 2025-01-03 PROCEDURE — 36430 TRANSFUSION BLD/BLD COMPNT: CPT

## 2025-01-03 PROCEDURE — 2550000001 HC RX 255 CONTRASTS: Performed by: STUDENT IN AN ORGANIZED HEALTH CARE EDUCATION/TRAINING PROGRAM

## 2025-01-03 PROCEDURE — P9016 RBC LEUKOCYTES REDUCED: HCPCS

## 2025-01-03 PROCEDURE — RXMED WILLOW AMBULATORY MEDICATION CHARGE

## 2025-01-03 PROCEDURE — 93005 ELECTROCARDIOGRAM TRACING: CPT

## 2025-01-03 PROCEDURE — 84484 ASSAY OF TROPONIN QUANT: CPT | Performed by: NURSE PRACTITIONER

## 2025-01-03 PROCEDURE — 85014 HEMATOCRIT: CPT | Performed by: STUDENT IN AN ORGANIZED HEALTH CARE EDUCATION/TRAINING PROGRAM

## 2025-01-03 PROCEDURE — 99291 CRITICAL CARE FIRST HOUR: CPT | Mod: 25 | Performed by: NURSE PRACTITIONER

## 2025-01-03 PROCEDURE — 83690 ASSAY OF LIPASE: CPT | Performed by: NURSE PRACTITIONER

## 2025-01-03 PROCEDURE — 96374 THER/PROPH/DIAG INJ IV PUSH: CPT | Mod: 59

## 2025-01-03 PROCEDURE — 71046 X-RAY EXAM CHEST 2 VIEWS: CPT

## 2025-01-03 PROCEDURE — 71046 X-RAY EXAM CHEST 2 VIEWS: CPT | Mod: FOREIGN READ | Performed by: RADIOLOGY

## 2025-01-03 PROCEDURE — 85025 COMPLETE CBC W/AUTO DIFF WBC: CPT | Performed by: NURSE PRACTITIONER

## 2025-01-03 PROCEDURE — 86901 BLOOD TYPING SEROLOGIC RH(D): CPT | Performed by: NURSE PRACTITIONER

## 2025-01-03 PROCEDURE — 74177 CT ABD & PELVIS W/CONTRAST: CPT

## 2025-01-03 RX ORDER — PANTOPRAZOLE SODIUM 40 MG/10ML
40 INJECTION, POWDER, LYOPHILIZED, FOR SOLUTION INTRAVENOUS 2 TIMES DAILY
Status: DISCONTINUED | OUTPATIENT
Start: 2025-01-03 | End: 2025-01-03 | Stop reason: HOSPADM

## 2025-01-03 RX ORDER — POLYETHYLENE GLYCOL 3350 17 G/17G
17 POWDER, FOR SOLUTION ORAL DAILY
Status: DISCONTINUED | OUTPATIENT
Start: 2025-01-03 | End: 2025-01-03 | Stop reason: HOSPADM

## 2025-01-03 RX ORDER — OMEPRAZOLE 20 MG/1
20 CAPSULE, DELAYED RELEASE ORAL DAILY
Qty: 30 CAPSULE | Refills: 0 | Status: SHIPPED | OUTPATIENT
Start: 2025-01-03 | End: 2025-01-07 | Stop reason: SDUPTHER

## 2025-01-03 RX ORDER — SENNOSIDES 8.6 MG/1
2 TABLET ORAL 2 TIMES DAILY
Status: DISCONTINUED | OUTPATIENT
Start: 2025-01-03 | End: 2025-01-03 | Stop reason: HOSPADM

## 2025-01-03 RX ADMIN — PANTOPRAZOLE SODIUM 40 MG: 40 INJECTION, POWDER, FOR SOLUTION INTRAVENOUS at 12:29

## 2025-01-03 RX ADMIN — IOHEXOL 75 ML: 350 INJECTION, SOLUTION INTRAVENOUS at 09:03

## 2025-01-03 ASSESSMENT — HEART SCORE
AGE: <45
TROPONIN: LESS THAN OR EQUAL TO NORMAL LIMIT
HEART SCORE: 1
RISK FACTORS: 1-2 RISK FACTORS
ECG: NORMAL
HISTORY: SLIGHTLY SUSPICIOUS

## 2025-01-03 ASSESSMENT — COLUMBIA-SUICIDE SEVERITY RATING SCALE - C-SSRS
1. IN THE PAST MONTH, HAVE YOU WISHED YOU WERE DEAD OR WISHED YOU COULD GO TO SLEEP AND NOT WAKE UP?: NO
6. HAVE YOU EVER DONE ANYTHING, STARTED TO DO ANYTHING, OR PREPARED TO DO ANYTHING TO END YOUR LIFE?: NO
2. HAVE YOU ACTUALLY HAD ANY THOUGHTS OF KILLING YOURSELF?: NO

## 2025-01-03 ASSESSMENT — PAIN DESCRIPTION - DESCRIPTORS: DESCRIPTORS: PRESSURE

## 2025-01-03 ASSESSMENT — PAIN - FUNCTIONAL ASSESSMENT: PAIN_FUNCTIONAL_ASSESSMENT: 0-10

## 2025-01-03 ASSESSMENT — PAIN SCALES - GENERAL
PAINLEVEL_OUTOF10: 2
PAINLEVEL_OUTOF10: 0 - NO PAIN

## 2025-01-03 NOTE — PROGRESS NOTES
Medication Education     Medication education for Jeremi Monroy was provided to the patient  for the following medication(s):  Omeprazole       Medication education provided by a Pharmacist:  Proper dose, indication, possible ADRs    Identified potential barriers to education:  None    Method(s) of Education:  Verbal    An opportunity to ask questions and receive answers was provided.     Assessment of understanding the patient :  2= meets goals/outcomes    Additional Notes (if applicable):     Catherine Gillespie, PharmD

## 2025-01-03 NOTE — ED NOTES
Pt arrives to ED via private vehicle from home.      Code Status:  No Order    HPI     Chief Complaint   Patient presents with    low hemoglobin     Pt c/o near syncopal episode so he got his labs checked, pts hemoglobin 6.5 yesterday.       /69   Pulse 55   Temp 36.5 °C (97.7 °F) (Temporal)   Resp 16   Wt 45.4 kg (100 lb)   SpO2 99%     Crescencio Coma Scale Score: 15      LDA:   Peripheral IV 01/03/25 20 G Distal;Right;Upper Arm (Active)   Placement Date/Time: 01/03/25 0739   Size (Gauge): 20 G  Orientation: Distal;Right;Upper  Location: Arm   Number of days: 0        BACKGROUND  Past Medical History:   Diagnosis Date    (HFpEF) heart failure with preserved ejection fraction     Anxiety and depression     History of ETOH abuse     HTN (hypertension)     Seizure disorder (Multi)     alcoholic     History reviewed. No pertinent surgical history.  No current facility-administered medications on file prior to encounter.     Current Outpatient Medications on File Prior to Encounter   Medication Sig Dispense Refill    lisinopril 5 mg tablet Take 1 tablet (5 mg) by mouth early in the morning.. 90 tablet 1    metoprolol succinate XL (Toprol-XL) 25 mg 24 hr tablet Take 1 tablet (25 mg) by mouth 2 times a day. 180 tablet 3    mirtazapine (Remeron) 15 mg tablet Take 1 tablet (15 mg) by mouth once daily at bedtime.      multivitamin tablet Take 1 tablet by mouth once daily.      QUEtiapine (SEROquel) 50 mg tablet Take 1 tablet (50 mg) by mouth once daily at bedtime.      thiamine (Vitamin B-1) 50 mg tablet Take 1 tablet (50 mg) by mouth once daily.          ASSESSMENT  ED Course as of 01/03/25 1150   Fri Jan 03, 2025 0844 Patient gave verbal and written consent for blood transfusion, consent documented under consent tab in EMR. I reviewed the procedure, indications reviewed with patient. Risks and benefits discussed. Patient voiced understanding of the procedure, risks and benefits and all questions were answered.   [JG]      ED Course User Index  [JG] Chiqui Guzman MD         Diagnoses as of 01/03/25 1150   Acute alcoholic gastritis, presence of bleeding unspecified   Anemia, unspecified type   Hyponatremia       Medications Currently Running:       Medications Given:  ED Medication Administration from 01/03/2025 0632 to 01/03/2025 1150         Date/Time Order Dose Route Action Action by     01/03/2025 0903 EST iohexol (OMNIPaque) 350 mg iodine/mL solution 75 mL 75 mL intravenous Given Page, C                 RESULTS    Imaging:  CT abdomen pelvis w IV contrast   Final Result   Addendum (preliminary) 1 of 1   Critical value: These findings were discussed with Dr Jeremy Scott   on 1/3/2025 10:19 AM.   Additional history provided was EtOH use.  Follow-up upper endoscopy   can be performed for more definitive evaluation.  Abdominal ultrasound   can also be performed to further evaluate the abnormal liver findings.   Signed by Sha Soliman MD      Final   1.  There is prominent diffuse hypoattenuated wall thickening and   edema involving the gastric fundus and proximal lesser and greater   curvatures of the stomach, associated with moderate narrowing of the   proximal gastric lumen.  Differential considerations would include   severe focal peptic ulcer disease and gastritis, however, an   underlying proximal gastric mass or neoplasm cannot be excluded.    There are mildly enlarged adjacent gastrohepatic lymph nodes.   2.  Mild irregular peripheral linear hyperattenuation primarily noted   within the right hepatic lobe which may reflect intrahepatic biliary   dilatation.   3.  There is extensive fecal retention within the colon and small   bowel suggestive for severe constipation.   4.  Urinary bladder is mildly distended with moderate diffuse wall   thickening.  Recommend correlation with urinalysis to exclude   cystitis.   Signed by Sha Soliman MD      XR chest 2 views   Final Result   Impression:   No findings  of an acute cardiopulmonary process.   Signed by Hieu Chris MD         }  Labs ::99  Abnormal Labs Reviewed   CBC WITH AUTO DIFFERENTIAL - Abnormal; Notable for the following components:       Result Value    RBC 1.92 (*)     Hemoglobin 6.7 (*)     Hematocrit 19.6 (*)      (*)     MCH 34.9 (*)     Lymphocytes Absolute 0.51 (*)     All other components within normal limits   COMPREHENSIVE METABOLIC PANEL - Abnormal; Notable for the following components:    Sodium 128 (*)     Chloride 95 (*)     Calcium 8.4 (*)     Alkaline Phosphatase 133 (*)     All other components within normal limits   PROTIME-INR - Abnormal; Notable for the following components:    Protime 13.4 (*)     INR 1.2 (*)     All other components within normal limits                Jacek Bill RN  01/03/25 5356

## 2025-01-03 NOTE — PROGRESS NOTES
Jeremi Monroy is a 34 y.o. male admitted for Acute alcoholic gastritis, presence of bleeding unspecified. Pharmacy reviewed the patient's [id number] medications and allergies for accuracy.    The list below reflects the PTA list prior to pharmacy medication history. A summary a changes to the PTA medication list has been listed below. Please review each medication in order reconciliation for additional clarification and justification.    Source of information: t2p - no  changes     Medications added:    Medications modified:    Medications to be removed:    Medications of concern:      Prior to Admission Medications   Prescriptions Last Dose Informant Patient Reported? Taking?   QUEtiapine (SEROquel) 50 mg tablet   Yes No   Sig: Take 1 tablet (50 mg) by mouth once daily at bedtime.   lisinopril 5 mg tablet   No No   Sig: Take 1 tablet (5 mg) by mouth early in the morning..   metoprolol succinate XL (Toprol-XL) 25 mg 24 hr tablet   No No   Sig: Take 1 tablet (25 mg) by mouth 2 times a day.   mirtazapine (Remeron) 15 mg tablet   Yes No   Sig: Take 1 tablet (15 mg) by mouth once daily at bedtime.   multivitamin tablet   Yes No   Sig: Take 1 tablet by mouth once daily.   thiamine (Vitamin B-1) 50 mg tablet   Yes No   Sig: Take 1 tablet (50 mg) by mouth once daily.      Facility-Administered Medications: None       FELICIA SALCEDO

## 2025-01-03 NOTE — ED PROVIDER NOTES
Chief Complaint   Patient presents with    low hemoglobin     Pt c/o near syncopal episode so he got his labs checked, pts hemoglobin 6.5 yesterday.       HPI       34 year old male presents to the Emergency Department today complaining of having a low hemoglobin noted on outpatient labs yesterday. Notes that he had a near syncopal episode 2 days ago where he hit his neck on a garbage can. Denies hitting their head, loss of consciousness, or seizure-like activity. Denies any other injuries. Since the fall he has noticed that he has been dyspneic upon exertion. Developed midsernal chest tightness this am, constant, non-radiating, and varies in intensity. Denies any associated fever, chills, headache, neck pain, chest pain, abdominal pain, nausea, vomiting, diarrhea, constipation, hematemesis, hematochezia, melena, or urinary symptoms.       History provided by:  Patient             Patient History   Past Medical History:   Diagnosis Date    (HFpEF) heart failure with preserved ejection fraction     Anxiety and depression     History of ETOH abuse     HTN (hypertension)     Seizure disorder (Multi)     alcoholic     History reviewed. No pertinent surgical history.  Family History   Problem Relation Name Age of Onset    No Known Problems Mother      Hypertension Father       Social History     Tobacco Use    Smoking status: Never    Smokeless tobacco: Never   Vaping Use    Vaping status: Never Used   Substance Use Topics    Alcohol use: Not Currently    Drug use: Never           Physical Exam  Constitutional:       Appearance: Normal appearance. He is underweight.   HENT:      Head: Normocephalic.      Right Ear: Tympanic membrane, ear canal and external ear normal.      Left Ear: Tympanic membrane, ear canal and external ear normal.      Nose: Nose normal.      Mouth/Throat:      Mouth: Mucous membranes are moist.      Pharynx: Oropharynx is clear. No oropharyngeal exudate or posterior oropharyngeal erythema.   Eyes:       Conjunctiva/sclera: Conjunctivae normal.      Pupils: Pupils are equal, round, and reactive to light.      Comments: Pale conjunctiva.   Cardiovascular:      Rate and Rhythm: Normal rate and regular rhythm.      Pulses:           Radial pulses are 3+ on the right side and 3+ on the left side.        Dorsalis pedis pulses are 3+ on the right side and 3+ on the left side.      Heart sounds: Normal heart sounds. No murmur heard.     No friction rub. No gallop.   Pulmonary:      Effort: Pulmonary effort is normal. No respiratory distress.      Breath sounds: Normal breath sounds. No wheezing, rhonchi or rales.   Abdominal:      General: Abdomen is flat. Bowel sounds are normal.      Palpations: Abdomen is soft.      Tenderness: There is no abdominal tenderness. There is no right CVA tenderness, left CVA tenderness, guarding or rebound. Negative signs include Salinas's sign and McBurney's sign.   Musculoskeletal:         General: No swelling or deformity.      Cervical back: Full passive range of motion without pain.      Right lower leg: No edema.      Left lower leg: No edema.   Lymphadenopathy:      Cervical: No cervical adenopathy.   Skin:     Capillary Refill: Capillary refill takes less than 2 seconds.      Coloration: Skin is not jaundiced.      Findings: No rash.   Neurological:      General: No focal deficit present.      Mental Status: He is alert and oriented to person, place, and time. Mental status is at baseline.      Gait: Gait is intact.   Psychiatric:         Mood and Affect: Mood normal.         Behavior: Behavior is cooperative.         Labs Reviewed   CBC WITH AUTO DIFFERENTIAL - Abnormal       Result Value    WBC 5.8      nRBC 0.0      RBC 1.92 (*)     Hemoglobin 6.7 (*)     Hematocrit 19.6 (*)      (*)     MCH 34.9 (*)     MCHC 34.2      RDW 13.2      Platelets 189      Neutrophils % 80.9      Immature Granulocytes %, Automated 0.5      Lymphocytes % 8.8      Monocytes % 9.2       Eosinophils % 0.3      Basophils % 0.3      Neutrophils Absolute 4.67      Immature Granulocytes Absolute, Automated 0.03      Lymphocytes Absolute 0.51 (*)     Monocytes Absolute 0.53      Eosinophils Absolute 0.02      Basophils Absolute 0.02     COMPREHENSIVE METABOLIC PANEL - Abnormal    Glucose 82      Sodium 128 (*)     Potassium 4.2      Chloride 95 (*)     Bicarbonate 25      Anion Gap 12      Urea Nitrogen 13      Creatinine 0.53      eGFR >90      Calcium 8.4 (*)     Albumin 3.8      Alkaline Phosphatase 133 (*)     Total Protein 6.7      AST 26      Bilirubin, Total 0.3      ALT 25     PROTIME-INR - Abnormal    Protime 13.4 (*)     INR 1.2 (*)    HEMOGLOBIN AND HEMATOCRIT, BLOOD - Abnormal    Hemoglobin 6.6 (*)     Hematocrit 19.6 (*)    LIPASE - Normal    Lipase 32      Narrative:     Venipuncture immediately after or during the administration of Metamizole may lead to falsely low results. Testing should be performed immediately prior to Metamizole dosing.   SERIAL TROPONIN-INITIAL - Normal    Troponin I, High Sensitivity 3      Narrative:     Less than 99th percentile of normal range cutoff-  Female and children under 18 years old <14 ng/L; Male <21 ng/L: Negative  Repeat testing should be performed if clinically indicated.     Female and children under 18 years old 14-50 ng/L; Male 21-50 ng/L:  Consistent with possible cardiac damage and possible increased clinical   risk. Serial measurements may help to assess extent of myocardial damage.     >50 ng/L: Consistent with cardiac damage, increased clinical risk and  myocardial infarction. Serial measurements may help assess extent of   myocardial damage.      NOTE: Children less than 1 year old may have higher baseline troponin   levels and results should be interpreted in conjunction with the overall   clinical context.     NOTE: Troponin I testing is performed using a different   testing methodology at [facility] than at other   system  hospitals. Direct result comparisons should only   be made within the same method.   SERIAL TROPONIN, 1 HOUR - Normal    Troponin I, High Sensitivity <3      Narrative:     Less than 99th percentile of normal range cutoff-  Female and children under 18 years old <14 ng/L; Male <21 ng/L: Negative  Repeat testing should be performed if clinically indicated.     Female and children under 18 years old 14-50 ng/L; Male 21-50 ng/L:  Consistent with possible cardiac damage and possible increased clinical   risk. Serial measurements may help to assess extent of myocardial damage.     >50 ng/L: Consistent with cardiac damage, increased clinical risk and  myocardial infarction. Serial measurements may help assess extent of   myocardial damage.      NOTE: Children less than 1 year old may have higher baseline troponin   levels and results should be interpreted in conjunction with the overall   clinical context.     NOTE: Troponin I testing is performed using a different   testing methodology at [facility] than at other   [facility]. Direct result comparisons should only   be made within the same method.   TROPONIN SERIES- (INITIAL, 1 HR)    Narrative:     The following orders were created for panel order Troponin I Series, High Sensitivity (0, 1 HR).  Procedure                               Abnormality         Status                     ---------                               -----------         ------                     Troponin I, High Sensiti...[[id number]]  Normal              Final result               Troponin, High Sensitivi...[[id number]]  Normal              Final result                 Please view results for these tests on the individual orders.   TYPE AND SCREEN    ABO TYPE O      Rh TYPE POS      ANTIBODY SCREEN NEG     VERAB/VERIFY ABORH    ABO TYPE O      Rh TYPE POS     PREPARE RBC    PRODUCT CODE T5612S03      Unit Number [id number]      Unit ABO O      Unit RH POS      XM INTEP COMP       Dispense Status TR      Blood Expiration Date 1/8/2025 11:59:00 PM EST      PRODUCT BLOOD TYPE 5100      UNIT VOLUME 350     MORPHOLOGY    RBC Morphology See Below      Polychromasia Mild      Hypochromia Mild      Acanthocytes Few         CT abdomen pelvis w IV contrast   Final Result   Addendum (preliminary) 1 of 1   Critical value: These findings were discussed with Dr Jeremy Scott   on 1/3/2025 10:19 AM.   Additional history provided was EtOH use.  Follow-up upper endoscopy   can be performed for more definitive evaluation.  Abdominal ultrasound   can also be performed to further evaluate the abnormal liver findings.   Signed by Sha Soliman MD      Final   1.  There is prominent diffuse hypoattenuated wall thickening and   edema involving the gastric fundus and proximal lesser and greater   curvatures of the stomach, associated with moderate narrowing of the   proximal gastric lumen.  Differential considerations would include   severe focal peptic ulcer disease and gastritis, however, an   underlying proximal gastric mass or neoplasm cannot be excluded.    There are mildly enlarged adjacent gastrohepatic lymph nodes.   2.  Mild irregular peripheral linear hyperattenuation primarily noted   within the right hepatic lobe which may reflect intrahepatic biliary   dilatation.   3.  There is extensive fecal retention within the colon and small   bowel suggestive for severe constipation.   4.  Urinary bladder is mildly distended with moderate diffuse wall   thickening.  Recommend correlation with urinalysis to exclude   cystitis.   Signed by Sha Soliman MD      XR chest 2 views   Final Result   Impression:   No findings of an acute cardiopulmonary process.   Signed by Hieu Chris MD               ED Course & MDM   ED Course as of 01/03/25 1242   Fri Jan 03, 2025   0844 Patient gave verbal and written consent for blood transfusion, consent documented under consent tab in EMR. I reviewed the  procedure, indications reviewed with patient. Risks and benefits discussed. Patient voiced understanding of the procedure, risks and benefits and all questions were answered.  [JG]      ED Course User Index  [JG] Chiqui Guzman MD         Diagnoses as of 01/03/25 1242   Acute alcoholic gastritis, presence of bleeding unspecified   Anemia, unspecified type   Hyponatremia           Medical Decision Making  EKG interpreted by Dr. Guzman. Indication: chest pain. Findings: NSR with a ventricular rate of 67, normal axis, normal intervals, and no acute ischemic or injury pattern. Impression: No acute pathology.       Patient was seen and evaluated by Dr. Guzman. Saline lock was established with labs drawn and results as above. Noted to be severely anemic with H & H of 6.7/19.6. Record review shows that his last hemoglobin was 10.2/30.0 5 months ago. He was typed and crossed for 1 unit of PRBC's. Sodium was at baseline. Mildly elevated clotting factors with PT/INR of 13.4/1.2. Remainder of blood counts, electrolytes, kidney function, liver function, and lipase were all unremarkable. Heart Score- 1 with normal EKG and troponin with delta troponin. At this time, we feel that the chest pain is atypical for acute coronary syndrome. We find no underlying evidence of an acute infectious process or pneumothorax on CXR. Clinically, we do not feel they are exhibiting signs of pulmonary embolism or thoracic aortic dissection (no connective tissue disorder, no tachycardia, tachypnea, hypoxia, and mediastinum normal in size on CXR). CT scan of his abdomen and pelvis with contrast shows prominent diffuse hypoattenuated wall thickening and   edema involving the gastric fundus and proximal lesser and greater curvatures of the stomach, associated with moderate narrowing of the proximal gastric lumen- differential considerations would include severe focal peptic ulcer disease and gastritis, however, an underlying proximal gastric  mass or neoplasm cannot be excluded- there are mildly enlarged adjacent gastrohepatic lymph nodes; mild irregular peripheral linear hyperattenuation primarily noted within the right hepatic lobe which may reflect intrahepatic biliary dilatation; there is extensive fecal retention within the colon and small bowel suggestive for severe constipation; and urinary bladder is mildly distended with moderate diffuse wall thickening. Repeat abdominal evaluation reveals an abdomen soft, nondistended, and nontender to palpation. There was no rebound, rigidity, or guarding noted. There were no peritoneal signs noted. Continued to have multiple benign serial abdominal exams. At this time, we find no underlying evidence of acute pancreatitis, cholecystitis, cholangitis, diverticulitis, or appendicitis. Case was discussed with A LAURA Martinez CNP, who agrees to place the patient under 23 hour observation for further evaluation and care. After setting him up for observation status, he decided that he did not want to stay in the hospital. I had a lengthy discussion with the patient that the CT findings show a mass that will need further evaluation by GI. He is aware that the mass could be related to cancer. He could have an underlying bleeding ulcer as well. With his severe anemia, we recommend further inpatient evaluation for such. He is aware that the bleeding will likely continue and that puts him at risk for ischemic issues that correspond with anemia. He is aware that leaving against medical advice puts him at risk for having permanent disability, permanent vegetative state, and possibly even death. He verbalized understanding of such. He is aware that he may return at any time if he should change his mind. Made aware that we sometimes lack GI coverage that would cause him to be transferred if he returns during one of the times when we lapse coverage. He is otherwise leaving against medical advice with a prescription for  Prilosec and a referral to GI.    Diagnostic Impression:    1. Acute GI bleed with severe anemia    2. Gastric mass    3. Blood transfusion    4. Prescription therapy    5. Critical care time 40 minutes.            Your medication list        ASK your doctor about these medications        Instructions Last Dose Given Next Dose Due   lisinopril 5 mg tablet      Take 1 tablet (5 mg) by mouth early in the morning..       metoprolol succinate XL 25 mg 24 hr tablet  Commonly known as: Toprol-XL      Take 1 tablet (25 mg) by mouth 2 times a day.       mirtazapine 15 mg tablet  Commonly known as: Remeron           multivitamin tablet           QUEtiapine 50 mg tablet  Commonly known as: SEROquel           thiamine 50 mg tablet  Commonly known as: Vitamin B-1                      Procedure  Critical Care    Performed by: ANTONIO Pedraza  Authorized by: Chiqui Guzman MD    Critical care provider statement:     Critical care time (minutes):  40    Critical care time was exclusive of:  Separately billable procedures and treating other patients    Critical care was necessary to treat or prevent imminent or life-threatening deterioration of the following conditions: GI bleed requiring blood transfusion.    Critical care was time spent personally by me on the following activities:  Blood draw for specimens, development of treatment plan with patient or surrogate, discussions with consultants, evaluation of patient's response to treatment, obtaining history from patient or surrogate, review of old charts, re-evaluation of patient's condition, pulse oximetry, ordering and review of radiographic studies, ordering and review of laboratory studies and ordering and performing treatments and interventions       ANTONIO Pedraza  01/03/25 1257

## 2025-01-06 ENCOUNTER — APPOINTMENT (OUTPATIENT)
Dept: HEMATOLOGY/ONCOLOGY | Facility: CLINIC | Age: 35
End: 2025-01-06
Payer: COMMERCIAL

## 2025-01-07 ENCOUNTER — OFFICE VISIT (OUTPATIENT)
Facility: CLINIC | Age: 35
End: 2025-01-07
Payer: COMMERCIAL

## 2025-01-07 VITALS
HEIGHT: 71 IN | SYSTOLIC BLOOD PRESSURE: 98 MMHG | BODY MASS INDEX: 14.03 KG/M2 | WEIGHT: 100.2 LBS | OXYGEN SATURATION: 98 % | HEART RATE: 67 BPM | DIASTOLIC BLOOD PRESSURE: 62 MMHG

## 2025-01-07 DIAGNOSIS — D64.9 ANEMIA, UNSPECIFIED TYPE: Primary | ICD-10-CM

## 2025-01-07 DIAGNOSIS — R93.3 ABNORMAL CT SCAN, STOMACH: ICD-10-CM

## 2025-01-07 PROCEDURE — 3078F DIAST BP <80 MM HG: CPT | Performed by: NURSE PRACTITIONER

## 2025-01-07 PROCEDURE — 1036F TOBACCO NON-USER: CPT | Performed by: NURSE PRACTITIONER

## 2025-01-07 PROCEDURE — 99205 OFFICE O/P NEW HI 60 MIN: CPT | Performed by: NURSE PRACTITIONER

## 2025-01-07 PROCEDURE — 3008F BODY MASS INDEX DOCD: CPT | Performed by: NURSE PRACTITIONER

## 2025-01-07 PROCEDURE — 3074F SYST BP LT 130 MM HG: CPT | Performed by: NURSE PRACTITIONER

## 2025-01-07 RX ORDER — OMEPRAZOLE 40 MG/1
40 CAPSULE, DELAYED RELEASE ORAL
Qty: 60 CAPSULE | Refills: 1 | Status: SHIPPED | OUTPATIENT
Start: 2025-01-07 | End: 2025-03-08

## 2025-01-07 ASSESSMENT — ENCOUNTER SYMPTOMS
ADENOPATHY: 0
JOINT SWELLING: 0
SHORTNESS OF BREATH: 0
CHILLS: 0
TROUBLE SWALLOWING: 1
DIFFICULTY URINATING: 0
PALPITATIONS: 0
WEAKNESS: 0
ARTHRALGIAS: 0
SORE THROAT: 0
FEVER: 0
BRUISES/BLEEDS EASILY: 0
ROS GI COMMENTS: SEE HPI
WOUND: 0
CONFUSION: 0
COUGH: 0
DIZZINESS: 0

## 2025-01-07 NOTE — PATIENT INSTRUCTIONS
Thank you for coming to your appointment today   - do your blood work today  - avoid all NSAIDs. Acetaminophen is ok  - continue to avoid alcohol and tobacco   - increase the omeprazole to 40mg twice daily (before breakfast and before dinner)  - we will schedule you for an EGD in the endoscopy center     Please call [phone] with any questions or concerns       If you utilize Feed[employer]-Machine messages, please understand that these are intended to be used for simple and straightforward medical questions. If you have a more complex question or numerous complaints, an office appointment may be needed.

## 2025-01-07 NOTE — PROGRESS NOTES
Subjective   Patient ID: Jeremi Monroy is a 34 y.o. male with PMH of EToH abuse, HFpEF, seizures (alcoholic), anxiety, depression who was referred by No ref. provider found for Hospital Follow-up (Acute alcoholic gastritis/anemia./Labs 1/3/25- Hgb 6.6/CT abdomen/pelvis- 1/3/25).     Patient's PCP is MICHAEL Braswell-CNP     HPI  Patient was recently in ER on 1/3/25 for anemia, near-syncope, and alcoholic gastritis. H&H was 6.7/19.6 on admission. Imaging showed wall thickening of the stomach and focal narrowing of the proximal gastric lumen, with moderate distention of the distal stomach and enlarged gastrohepatic lymph nodes. He was given 1 unit of PRBC and admission was recommended, however, patient left AMA. He was given a prescription for omeprazole 20mg daily.     He is here today for follow up. He has been taking 2 of the omeprazole 20mg daily due to his in-law being a pharmacist and them recommending that. He has noticed some improvement in the chest /epigastric pain he has been having. He reports dysphagia and odynophagia that typically occur if he eats fast. He occasionally gets nauseated, but no vomiting. He denies unintended weight loss, melena, hematemesis, diarrhea, constipation, or hematochezia.     He rarely uses NSAIDs. Has had 4 pills in 1 month.     Of note, his BMI is only ~14 and he currently weighs 100lbs. He states this is actually an improvement from prior years and at one point he was down to 70lbs. He has a history of alcohol abuse and drank 1.75L or more of whiskey per day for many years in his 20s. At one point, he was on hospice but recovered after he quit drinking. He developed alcoholic cardiomyopathy and follows with cardiology (Jodee). Recent echocardiogram showed a normal EF.     No prior scopes     Summary of endoscopies:      Social Hx:  Tobacco: none   Etoh: prior heavy EToH (1.75L whiskey/day or more); sober x2 years   Recreational drug use: none  NSAIDs: rare        Family Hx:  Colon cancer -- father     Review of Systems:  Review of Systems   Constitutional:  Negative for chills and fever.   HENT:  Positive for trouble swallowing. Negative for sore throat.    Respiratory:  Negative for cough and shortness of breath.    Cardiovascular:  Negative for chest pain and palpitations.   Gastrointestinal:         SEE HPI   Endocrine: Negative for cold intolerance and heat intolerance.   Genitourinary:  Negative for difficulty urinating.   Musculoskeletal:  Negative for arthralgias and joint swelling.   Skin:  Negative for rash and wound.   Neurological:  Negative for dizziness and weakness.   Hematological:  Negative for adenopathy. Does not bruise/bleed easily.   Psychiatric/Behavioral:  Negative for confusion.         Medications:  Prior to Admission medications    Medication Sig Start Date End Date Taking? Authorizing Provider   lisinopril 5 mg tablet Take 1 tablet (5 mg) by mouth early in the morning.. 9/20/24   Yajaira Ellsworth MD   metoprolol succinate XL (Toprol-XL) 25 mg 24 hr tablet Take 1 tablet (25 mg) by mouth 2 times a day. 9/20/24   Yajaira Ellsworth MD   mirtazapine (Remeron) 15 mg tablet Take 1 tablet (15 mg) by mouth once daily at bedtime.    Historical Provider, MD   multivitamin tablet Take 1 tablet by mouth once daily.    Historical Provider, MD   omeprazole (PriLOSEC) 20 mg DR capsule Take 1 capsule (20 mg) by mouth once daily. Do not crush or chew. 1/3/25 2/2/25  Jeremy Scott, APRN-CNP   QUEtiapine (SEROquel) 50 mg tablet Take 1 tablet (50 mg) by mouth once daily at bedtime. 7/7/24   Historical Provider, MD   thiamine (Vitamin B-1) 50 mg tablet Take 1 tablet (50 mg) by mouth once daily.    Historical Provider, MD       Allergies:  Patient has no known allergies.    Past Medical History:  He has a past medical history of (HFpEF) heart failure with preserved ejection fraction, Anxiety and depression, History of ETOH abuse, HTN (hypertension), and  Seizure disorder (Multi).    Past Surgical History:  He has no past surgical history on file.    Social History:  He reports that he has never smoked. He has never used smokeless tobacco. He reports that he does not currently use alcohol. He reports that he does not use drugs.    Objective   Physical exam:  Physical Exam  Constitutional:       General: He is not in acute distress.     Comments: Cachexia    HENT:      Mouth/Throat:      Mouth: Mucous membranes are moist.      Comments: pink  Eyes:      Conjunctiva/sclera: Conjunctivae normal.      Pupils: Pupils are equal, round, and reactive to light.   Cardiovascular:      Rate and Rhythm: Normal rate and regular rhythm.      Heart sounds: No murmur heard.  Pulmonary:      Effort: Pulmonary effort is normal.      Breath sounds: Normal breath sounds.   Abdominal:      General: Bowel sounds are normal. There is no distension.      Palpations: Abdomen is soft.      Tenderness: There is no abdominal tenderness. There is no guarding.   Skin:     General: Skin is warm and dry.      Coloration: Skin is pale. Skin is not jaundiced.   Neurological:      Mental Status: He is alert and oriented to person, place, and time.   Psychiatric:         Mood and Affect: Mood normal.         Behavior: Behavior normal.          Assessment/Plan     Abnormal CT stomach, anemia   Recently in ER for severe anemia requiring blood transfusion. Left AMA after 1 unit. Imaging while in ER showed prominent diffuse wall thickening and edema of the gastric fundus and proximal lesser and greater curvatures of the stomach, and narrowing of the proximal gastric lumen, and mildly enlarged gastrohepatic lymph nodes. Recommend increasing PPI to omeprazole 40mg BID. Will check STAT CBC today to ensure hgb trending up; pt not having overt GI bleeding. Will plan for EGD to evaluate stomach.   - omeprazole 40mg BID  - Avoid NSAIDs   - STAT CBC  - EGD     History of heavy EToH; sober currently, abnormal CT  liver   LFTs have trended down over a couple of years; may consider RUQ US or other imaging of liver pending EGD results. Will address further at follow up with patient.        Samra Briscoe, APRN-CNP

## 2025-01-08 ENCOUNTER — LAB (OUTPATIENT)
Dept: LAB | Facility: LAB | Age: 35
End: 2025-01-08
Payer: COMMERCIAL

## 2025-01-08 DIAGNOSIS — D64.9 ANEMIA, UNSPECIFIED TYPE: ICD-10-CM

## 2025-01-08 DIAGNOSIS — R93.3 ABNORMAL CT SCAN, STOMACH: ICD-10-CM

## 2025-01-08 LAB
ERYTHROCYTE [DISTWIDTH] IN BLOOD BY AUTOMATED COUNT: 15.1 % (ref 11.5–14.5)
HCT VFR BLD AUTO: 22.9 % (ref 41–52)
HGB BLD-MCNC: 7.4 G/DL (ref 13.5–17.5)
MCH RBC QN AUTO: 33 PG (ref 26–34)
MCHC RBC AUTO-ENTMCNC: 32.3 G/DL (ref 32–36)
MCV RBC AUTO: 102 FL (ref 80–100)
NRBC BLD-RTO: 0 /100 WBCS (ref 0–0)
PLATELET # BLD AUTO: 220 X10*3/UL (ref 150–450)
RBC # BLD AUTO: 2.24 X10*6/UL (ref 4.5–5.9)
WBC # BLD AUTO: 5.5 X10*3/UL (ref 4.4–11.3)

## 2025-01-08 PROCEDURE — 85027 COMPLETE CBC AUTOMATED: CPT

## 2025-01-09 ENCOUNTER — APPOINTMENT (OUTPATIENT)
Dept: HEMATOLOGY/ONCOLOGY | Facility: CLINIC | Age: 35
End: 2025-01-09
Payer: COMMERCIAL

## 2025-01-12 ENCOUNTER — PATIENT MESSAGE (OUTPATIENT)
Facility: CLINIC | Age: 35
End: 2025-01-12
Payer: COMMERCIAL

## 2025-01-12 LAB
ATRIAL RATE: 67 BPM
P AXIS: 76 DEGREES
PR INTERVAL: 163 MS
Q ONSET: 253 MS
QRS COUNT: 11 BEATS
QRS DURATION: 101 MS
QT INTERVAL: 350 MS
QTC CALCULATION(BAZETT): 370 MS
QTC FREDERICIA: 363 MS
R AXIS: 60 DEGREES
T AXIS: 63 DEGREES
T OFFSET: 428 MS
VENTRICULAR RATE: 67 BPM

## 2025-01-13 ENCOUNTER — APPOINTMENT (OUTPATIENT)
Dept: HEMATOLOGY/ONCOLOGY | Facility: CLINIC | Age: 35
End: 2025-01-13
Payer: COMMERCIAL

## 2025-01-16 ENCOUNTER — ANESTHESIA EVENT (OUTPATIENT)
Dept: GASTROENTEROLOGY | Facility: HOSPITAL | Age: 35
End: 2025-01-16
Payer: COMMERCIAL

## 2025-01-16 ENCOUNTER — HOSPITAL ENCOUNTER (OUTPATIENT)
Dept: GASTROENTEROLOGY | Facility: HOSPITAL | Age: 35
Discharge: HOME | End: 2025-01-16
Payer: COMMERCIAL

## 2025-01-16 ENCOUNTER — ANESTHESIA (OUTPATIENT)
Dept: GASTROENTEROLOGY | Facility: HOSPITAL | Age: 35
End: 2025-01-16
Payer: COMMERCIAL

## 2025-01-16 VITALS
TEMPERATURE: 98.3 F | OXYGEN SATURATION: 100 % | RESPIRATION RATE: 12 BRPM | DIASTOLIC BLOOD PRESSURE: 65 MMHG | HEART RATE: 58 BPM | SYSTOLIC BLOOD PRESSURE: 97 MMHG

## 2025-01-16 DIAGNOSIS — K25.3 ACUTE PEPTIC ULCER OF STOMACH: Primary | ICD-10-CM

## 2025-01-16 DIAGNOSIS — R93.3 ABNORMAL CT SCAN, STOMACH: ICD-10-CM

## 2025-01-16 DIAGNOSIS — D64.9 ANEMIA, UNSPECIFIED TYPE: ICD-10-CM

## 2025-01-16 PROCEDURE — 2500000004 HC RX 250 GENERAL PHARMACY W/ HCPCS (ALT 636 FOR OP/ED): Performed by: NURSE ANESTHETIST, CERTIFIED REGISTERED

## 2025-01-16 PROCEDURE — 7100000010 HC PHASE TWO TIME - EACH INCREMENTAL 1 MINUTE

## 2025-01-16 PROCEDURE — 7100000009 HC PHASE TWO TIME - INITIAL BASE CHARGE

## 2025-01-16 PROCEDURE — 3700000002 HC GENERAL ANESTHESIA TIME - EACH INCREMENTAL 1 MINUTE

## 2025-01-16 PROCEDURE — 3700000001 HC GENERAL ANESTHESIA TIME - INITIAL BASE CHARGE

## 2025-01-16 PROCEDURE — 43239 EGD BIOPSY SINGLE/MULTIPLE: CPT | Performed by: INTERNAL MEDICINE

## 2025-01-16 RX ORDER — SODIUM CHLORIDE 0.9 % (FLUSH) 0.9 %
SYRINGE (ML) INJECTION AS NEEDED
Status: DISCONTINUED | OUTPATIENT
Start: 2025-01-16 | End: 2025-01-16

## 2025-01-16 RX ORDER — FENTANYL CITRATE 50 UG/ML
INJECTION, SOLUTION INTRAMUSCULAR; INTRAVENOUS AS NEEDED
Status: DISCONTINUED | OUTPATIENT
Start: 2025-01-16 | End: 2025-01-16

## 2025-01-16 RX ORDER — LIDOCAINE HYDROCHLORIDE 20 MG/ML
INJECTION, SOLUTION INFILTRATION; PERINEURAL AS NEEDED
Status: DISCONTINUED | OUTPATIENT
Start: 2025-01-16 | End: 2025-01-16

## 2025-01-16 RX ORDER — PROPOFOL 10 MG/ML
INJECTION, EMULSION INTRAVENOUS AS NEEDED
Status: DISCONTINUED | OUTPATIENT
Start: 2025-01-16 | End: 2025-01-16

## 2025-01-16 RX ADMIN — PROPOFOL 40 MG: 10 INJECTION, EMULSION INTRAVENOUS at 13:18

## 2025-01-16 RX ADMIN — PROPOFOL 50 MG: 10 INJECTION, EMULSION INTRAVENOUS at 13:14

## 2025-01-16 RX ADMIN — Medication 2 ML: at 13:14

## 2025-01-16 RX ADMIN — FENTANYL CITRATE 25 MCG: 50 INJECTION INTRAMUSCULAR; INTRAVENOUS at 13:14

## 2025-01-16 RX ADMIN — LIDOCAINE HYDROCHLORIDE 2 ML: 20 INJECTION, SOLUTION INFILTRATION; PERINEURAL at 13:14

## 2025-01-16 RX ADMIN — Medication 2 ML: at 13:18

## 2025-01-16 SDOH — HEALTH STABILITY: MENTAL HEALTH: CURRENT SMOKER: 0

## 2025-01-16 ASSESSMENT — PAIN SCALES - GENERAL
PAINLEVEL_OUTOF10: 0 - NO PAIN
PAIN_LEVEL: 0
PAINLEVEL_OUTOF10: 0 - NO PAIN

## 2025-01-16 ASSESSMENT — COLUMBIA-SUICIDE SEVERITY RATING SCALE - C-SSRS
1. IN THE PAST MONTH, HAVE YOU WISHED YOU WERE DEAD OR WISHED YOU COULD GO TO SLEEP AND NOT WAKE UP?: NO
2. HAVE YOU ACTUALLY HAD ANY THOUGHTS OF KILLING YOURSELF?: NO
6. HAVE YOU EVER DONE ANYTHING, STARTED TO DO ANYTHING, OR PREPARED TO DO ANYTHING TO END YOUR LIFE?: NO

## 2025-01-16 ASSESSMENT — PAIN - FUNCTIONAL ASSESSMENT
PAIN_FUNCTIONAL_ASSESSMENT: 0-10

## 2025-01-16 NOTE — ANESTHESIA PREPROCEDURE EVALUATION
Patient: Jeremi Monroy    Procedure Information       Date/Time: 01/16/25 1300    Scheduled providers: Mike Pope DO    Procedure: EGD    Location:  [facility]            Relevant Problems   Anesthesia (within normal limits)      Cardiac   (+) Hypertension      /Renal   (+) Hyponatremia       Clinical information reviewed:   Tobacco  Allergies  Meds   Med Hx  Surg Hx   Fam Hx  Soc Hx        NPO Detail:  NPO/Void Status  Carbohydrate Drink Given Prior to Surgery? : N  Date of Last Liquid: 01/16/25  Time of Last Liquid: 0200  Date of Last Solid: 01/15/25  Time of Last Solid: 1800  Last Intake Type: Clear fluids  Time of Last Void: 1214         Physical Exam    Airway  Mallampati: II  TM distance: >3 FB  Neck ROM: full     Cardiovascular - normal exam  Rhythm: regular  Rate: normal     Dental - normal exam     Pulmonary - normal exam     Abdominal - normal exam             Anesthesia Plan    History of general anesthesia?: yes  History of complications of general anesthesia?: no    ASA 3     MAC     The patient is not a current smoker.    intravenous induction

## 2025-01-16 NOTE — NURSING NOTE
1329: Patient to bay with anesthesia and surgical team present. Handoff report and plan of care reviewed, all questions answered. VSS.    1338: Dr Pope at bedside speaking to patient and family    1342: Patient tolerating sips of pepsi and cookies a this time    1350: Discharge instructions reviewed with patient and family, no further questions at this time.     1400: Peripheral IV removed with no complications.     1405: Patient dressed with family assistance.     1409: Patient to bathroom with RN and family assistance    1415: Patient to main lobby via transport with all belongings in stable condition. Phase 2 complete.

## 2025-01-16 NOTE — ANESTHESIA POSTPROCEDURE EVALUATION
Patient: Jeremi Monroy    Procedure Summary       Date: 01/16/25 Room / Location: [facility]    Anesthesia Start: 1310 Anesthesia Stop: 1330    Procedure: EGD Diagnosis:       Anemia, unspecified type      Abnormal CT scan, stomach    Scheduled Providers: Mike Pope DO Responsible Provider: KUMAR Walton    Anesthesia Type: MAC ASA Status: 3            Anesthesia Type: MAC    Vitals Value Taken Time   BP 97/65 01/16/25 1359   Temp 36.8 °C (98.3 °F) 01/16/25 1359   Pulse 58 01/16/25 1359   Resp 12 01/16/25 1359   SpO2 100 % 01/16/25 1359       Anesthesia Post Evaluation    Patient location during evaluation: bedside  Patient participation: complete - patient participated  Level of consciousness: awake and alert  Pain score: 0  Pain management: adequate  Airway patency: patent  Cardiovascular status: acceptable and hemodynamically stable  Respiratory status: acceptable  Hydration status: acceptable  Postoperative Nausea and Vomiting: none        There were no known notable events for this encounter.

## 2025-01-24 ENCOUNTER — APPOINTMENT (OUTPATIENT)
Dept: PRIMARY CARE | Facility: CLINIC | Age: 35
End: 2025-01-24
Payer: COMMERCIAL

## 2025-01-24 VITALS
HEART RATE: 59 BPM | HEIGHT: 71 IN | BODY MASS INDEX: 14.11 KG/M2 | TEMPERATURE: 97.3 F | OXYGEN SATURATION: 98 % | RESPIRATION RATE: 14 BRPM | SYSTOLIC BLOOD PRESSURE: 90 MMHG | WEIGHT: 100.8 LBS | DIASTOLIC BLOOD PRESSURE: 55 MMHG

## 2025-01-24 DIAGNOSIS — E87.1 HYPONATREMIA: ICD-10-CM

## 2025-01-24 DIAGNOSIS — R93.3 ABNORMAL CT SCAN, STOMACH: ICD-10-CM

## 2025-01-24 DIAGNOSIS — I10 HYPERTENSION, UNSPECIFIED TYPE: ICD-10-CM

## 2025-01-24 PROCEDURE — G2211 COMPLEX E/M VISIT ADD ON: HCPCS

## 2025-01-24 PROCEDURE — 99214 OFFICE O/P EST MOD 30 MIN: CPT

## 2025-01-24 PROCEDURE — 3078F DIAST BP <80 MM HG: CPT

## 2025-01-24 PROCEDURE — 1036F TOBACCO NON-USER: CPT

## 2025-01-24 PROCEDURE — 3074F SYST BP LT 130 MM HG: CPT

## 2025-01-24 PROCEDURE — 3008F BODY MASS INDEX DOCD: CPT

## 2025-01-24 RX ORDER — OMEPRAZOLE 40 MG/1
40 CAPSULE, DELAYED RELEASE ORAL
Qty: 60 CAPSULE | Refills: 1 | Status: SHIPPED | OUTPATIENT
Start: 2025-01-24 | End: 2025-03-25

## 2025-01-24 RX ORDER — LISINOPRIL 5 MG/1
5 TABLET ORAL
Qty: 90 TABLET | Refills: 1 | Status: SHIPPED | OUTPATIENT
Start: 2025-01-24

## 2025-01-24 SDOH — ECONOMIC STABILITY: FOOD INSECURITY: WITHIN THE PAST 12 MONTHS, YOU WORRIED THAT YOUR FOOD WOULD RUN OUT BEFORE YOU GOT MONEY TO BUY MORE.: NEVER TRUE

## 2025-01-24 SDOH — ECONOMIC STABILITY: FOOD INSECURITY: WITHIN THE PAST 12 MONTHS, THE FOOD YOU BOUGHT JUST DIDN'T LAST AND YOU DIDN'T HAVE MONEY TO GET MORE.: NEVER TRUE

## 2025-01-24 ASSESSMENT — LIFESTYLE VARIABLES
HOW OFTEN DO YOU HAVE A DRINK CONTAINING ALCOHOL: NEVER
HOW OFTEN DO YOU HAVE SIX OR MORE DRINKS ON ONE OCCASION: NEVER
SKIP TO QUESTIONS 9-10: 1
AUDIT-C TOTAL SCORE: 0
HOW MANY STANDARD DRINKS CONTAINING ALCOHOL DO YOU HAVE ON A TYPICAL DAY: PATIENT DOES NOT DRINK

## 2025-01-24 ASSESSMENT — ENCOUNTER SYMPTOMS
GASTROINTESTINAL NEGATIVE: 1
PSYCHIATRIC NEGATIVE: 1
NEUROLOGICAL NEGATIVE: 1
HEMATOLOGIC/LYMPHATIC NEGATIVE: 1
CARDIOVASCULAR NEGATIVE: 1
RESPIRATORY NEGATIVE: 1

## 2025-01-24 ASSESSMENT — PATIENT HEALTH QUESTIONNAIRE - PHQ9
2. FEELING DOWN, DEPRESSED OR HOPELESS: NOT AT ALL
1. LITTLE INTEREST OR PLEASURE IN DOING THINGS: NOT AT ALL
SUM OF ALL RESPONSES TO PHQ9 QUESTIONS 1 & 2: 0

## 2025-01-24 ASSESSMENT — ANXIETY QUESTIONNAIRES
5. BEING SO RESTLESS THAT IT IS HARD TO SIT STILL: NOT AT ALL
1. FEELING NERVOUS, ANXIOUS, OR ON EDGE: NOT AT ALL
GAD7 TOTAL SCORE: 0
4. TROUBLE RELAXING: NOT AT ALL
6. BECOMING EASILY ANNOYED OR IRRITABLE: NOT AT ALL
7. FEELING AFRAID AS IF SOMETHING AWFUL MIGHT HAPPEN: NOT AT ALL
2. NOT BEING ABLE TO STOP OR CONTROL WORRYING: NOT AT ALL
3. WORRYING TOO MUCH ABOUT DIFFERENT THINGS: NOT AT ALL
IF YOU CHECKED OFF ANY PROBLEMS ON THIS QUESTIONNAIRE, HOW DIFFICULT HAVE THESE PROBLEMS MADE IT FOR YOU TO DO YOUR WORK, TAKE CARE OF THINGS AT HOME, OR GET ALONG WITH OTHER PEOPLE: NOT DIFFICULT AT ALL

## 2025-01-24 ASSESSMENT — PAIN SCALES - GENERAL: PAINLEVEL_OUTOF10: 0-NO PAIN

## 2025-01-24 NOTE — PROGRESS NOTES
"Subjective   Patient ID: Jeremi Monroy is a 34 y.o. male who presents for Follow-up (Follow up , no concerns.).    HPI     Jeremi presents for a follow up visit. He recently went to the ER with a low hemoglobin of 6.7 (initially 6.5 from office lab work) and received a blood transfusion while he was there. He states that GI group was not on during the time he was there, so he did leave AMA and followed with GI outpatient as quickly as he was able to get in. He had a EGD completed on 1/16 and was found to have some bleeding ulcers. He is taking omeprazole BID and will have another EGD sometime in march- surgical pathology still pending from samples taken during EGD.  He is following with nephrology and cardiology regularly.  He endorses he feels fairly well- he does get tired with exertion but rests when he feels he needs to. He is also adding more foods rich in iron in his diet.     Review of Systems   Respiratory: Negative.     Cardiovascular: Negative.    Gastrointestinal: Negative.    Genitourinary: Negative.    Neurological: Negative.    Hematological: Negative.    Psychiatric/Behavioral: Negative.         Objective   BP 90/55 (BP Location: Right arm, Patient Position: Sitting, BP Cuff Size: Small adult)   Pulse 59   Temp 36.3 °C (97.3 °F) (Temporal)   Resp 14   Ht 1.803 m (5' 11\")   Wt 45.7 kg (100 lb 12.8 oz)   SpO2 98%   BMI 14.06 kg/m²     Physical Exam  Cardiovascular:      Rate and Rhythm: Normal rate and regular rhythm.      Pulses: Normal pulses.   Pulmonary:      Effort: Pulmonary effort is normal.      Breath sounds: Normal breath sounds.   Skin:     General: Skin is warm and dry.      Capillary Refill: Capillary refill takes less than 2 seconds.   Neurological:      Mental Status: He is oriented to person, place, and time.   Psychiatric:         Mood and Affect: Mood normal.         Behavior: Behavior normal.         Thought Content: Thought content normal.         Judgment: Judgment normal. "         Assessment/Plan   Problem List Items Addressed This Visit             ICD-10-CM    Hypertension I10    Relevant Medications    lisinopril 5 mg tablet    Hyponatremia E87.1    Relevant Medications    lisinopril 5 mg tablet     Other Visit Diagnoses         Codes    Abnormal CT scan, stomach     R93.3    Relevant Medications    omeprazole (PriLOSEC) 40 mg DR capsule

## 2025-01-24 NOTE — PATIENT INSTRUCTIONS
Thank you for coming to see me today.  If you have any questions or concerns following our visit, please contact the office.  Phone: [phone]    Follow up in 6 months    1)  Magnesium glycinate can help as a natural muscle relaxer that can help with your muscle cramping

## 2025-02-05 LAB
LAB AP ASR DISCLAIMER: NORMAL
LABORATORY COMMENT REPORT: NORMAL
PATH REPORT.FINAL DX SPEC: NORMAL
PATH REPORT.GROSS SPEC: NORMAL
PATH REPORT.RELEVANT HX SPEC: NORMAL
PATH REPORT.TOTAL CANCER: NORMAL

## 2025-02-15 ENCOUNTER — APPOINTMENT (OUTPATIENT)
Dept: URGENT CARE | Age: 35
End: 2025-02-15
Payer: COMMERCIAL

## 2025-02-16 ENCOUNTER — OFFICE VISIT (OUTPATIENT)
Dept: URGENT CARE | Age: 35
End: 2025-02-16
Payer: COMMERCIAL

## 2025-02-16 VITALS
SYSTOLIC BLOOD PRESSURE: 99 MMHG | TEMPERATURE: 97.8 F | OXYGEN SATURATION: 96 % | RESPIRATION RATE: 20 BRPM | DIASTOLIC BLOOD PRESSURE: 62 MMHG | HEART RATE: 60 BPM

## 2025-02-16 DIAGNOSIS — B96.89 ACUTE BACTERIAL SINUSITIS: Primary | ICD-10-CM

## 2025-02-16 DIAGNOSIS — J01.90 ACUTE BACTERIAL SINUSITIS: Primary | ICD-10-CM

## 2025-02-16 RX ORDER — AMOXICILLIN AND CLAVULANATE POTASSIUM 875; 125 MG/1; MG/1
875 TABLET, FILM COATED ORAL 2 TIMES DAILY
Qty: 20 TABLET | Refills: 0 | Status: SHIPPED | OUTPATIENT
Start: 2025-02-16 | End: 2025-02-16 | Stop reason: WASHOUT

## 2025-02-16 RX ORDER — AMOXICILLIN AND CLAVULANATE POTASSIUM 875; 125 MG/1; MG/1
875 TABLET, FILM COATED ORAL 2 TIMES DAILY
Qty: 20 TABLET | Refills: 0 | Status: SHIPPED | OUTPATIENT
Start: 2025-02-16 | End: 2025-02-23

## 2025-02-16 ASSESSMENT — ENCOUNTER SYMPTOMS
APPETITE CHANGE: 0
EYE PAIN: 0
RHINORRHEA: 0
ABDOMINAL PAIN: 0
TROUBLE SWALLOWING: 0
EYE ITCHING: 0
SHORTNESS OF BREATH: 0
MYALGIAS: 0
ACTIVITY CHANGE: 0
CHILLS: 0
LIGHT-HEADEDNESS: 0
SINUS PAIN: 1
FACIAL SWELLING: 0
FATIGUE: 1
CHEST TIGHTNESS: 0
DIZZINESS: 0
COUGH: 1
SINUS PRESSURE: 1
FEVER: 0
WHEEZING: 0
EYE DISCHARGE: 0
SORE THROAT: 1

## 2025-02-16 NOTE — PROGRESS NOTES
Subjective   Patient ID: Jeremi Monroy is a 34 y.o. male. They present today with a chief complaint of Sore Throat, Cough, and Nasal Congestion (Symptoms for 2 weeks).    History of Present Illness    History provided by:  Patient  Sore Throat   Associated symptoms include congestion and coughing. Pertinent negatives include no abdominal pain, drooling, ear discharge, ear pain, shortness of breath or trouble swallowing.   Cough  Associated symptoms include postnasal drip and a sore throat. Pertinent negatives include no chills, ear pain, fever, myalgias, rhinorrhea, shortness of breath or wheezing.     Patient states he has had on and off sinus congestion for 2 weeks. He has had increased sore throat, congestion, sinus pressure. Denies fever/chills. Denies chest pain/sob.      Past Medical History  Allergies as of 02/16/2025    (No Known Allergies)       (Not in a hospital admission)       Past Medical History:   Diagnosis Date    (HFpEF) heart failure with preserved ejection fraction     Anxiety and depression     History of ETOH abuse     HTN (hypertension)     Seizure disorder (Multi)     alcoholic       History reviewed. No pertinent surgical history.     reports that he has never smoked. He has never used smokeless tobacco. He reports that he does not currently use alcohol. He reports that he does not use drugs.    Review of Systems  Review of Systems   Constitutional:  Positive for fatigue. Negative for activity change, appetite change, chills and fever.   HENT:  Positive for congestion, postnasal drip, sinus pressure, sinus pain and sore throat. Negative for dental problem, drooling, ear discharge, ear pain, facial swelling, nosebleeds, rhinorrhea, sneezing and trouble swallowing.    Eyes:  Negative for pain, discharge and itching.   Respiratory:  Positive for cough. Negative for chest tightness, shortness of breath and wheezing.    Gastrointestinal:  Negative for abdominal pain.   Musculoskeletal:  Negative  for myalgias.   Neurological:  Negative for dizziness and light-headedness.                                  Objective    Vitals:    02/16/25 0944   BP: 99/62   Pulse: 60   Resp: 20   Temp: 36.6 °C (97.8 °F)   SpO2: 96%     No LMP for male patient.    Physical Exam  Constitutional:       General: He is not in acute distress.     Appearance: He is ill-appearing (chronic).   HENT:      Right Ear: Ear canal normal. A middle ear effusion is present.      Left Ear: Ear canal normal. A middle ear effusion is present.      Nose: Congestion present.      Right Turbinates: Swollen.      Left Turbinates: Swollen.      Right Sinus: Frontal sinus tenderness present.      Left Sinus: Frontal sinus tenderness present.      Mouth/Throat:      Mouth: Mucous membranes are moist.      Tongue: No lesions.      Pharynx: Oropharynx is clear.      Tonsils: No tonsillar exudate.   Eyes:      General: No scleral icterus.  Cardiovascular:      Rate and Rhythm: Normal rate and regular rhythm.   Pulmonary:      Effort: Pulmonary effort is normal.      Breath sounds: Normal breath sounds.   Neurological:      Mental Status: He is alert.         Procedures    Point of Care Test & Imaging Results from this visit  No results found for this visit on 02/16/25.   No results found.    Diagnostic study results (if any) were reviewed by ANTONIO Almeida.    Assessment/Plan   Allergies, medications, history, and pertinent labs/EKGs/Imaging reviewed by ANTONIO Almeida.     Medical Decision Making  History and examination consistent with acute uncomplicated sinusitis. No indication for labs or imaging at this time. No evidence of sepsis, strep pharyngitis, or pneumonia. Counseled patient/family on antibiotic treatment and supportive measures at home. Patient advised to return to clinic or present to ED if symptoms change or worsen. Otherwise follow-up with PCP. Patient verbalized understanding and agrees with plan.        Orders and Diagnoses  Diagnoses and all orders for this visit:  Acute bacterial sinusitis  -     amoxicillin-pot clavulanate (Augmentin) 875-125 mg tablet; Take 1 tablet (875 mg) by mouth 2 times a day for 7 days.      Medical Admin Record      Patient disposition: Home    Electronically signed by ANTONIO Almeida  10:02 AM

## 2025-03-19 ENCOUNTER — ANESTHESIA EVENT (OUTPATIENT)
Dept: GASTROENTEROLOGY | Facility: HOSPITAL | Age: 35
End: 2025-03-19
Payer: COMMERCIAL

## 2025-03-20 ENCOUNTER — HOSPITAL ENCOUNTER (OUTPATIENT)
Dept: GASTROENTEROLOGY | Facility: HOSPITAL | Age: 35
Discharge: HOME | End: 2025-03-20
Payer: COMMERCIAL

## 2025-03-20 ENCOUNTER — ANESTHESIA (OUTPATIENT)
Dept: GASTROENTEROLOGY | Facility: HOSPITAL | Age: 35
End: 2025-03-20
Payer: COMMERCIAL

## 2025-03-20 VITALS
HEART RATE: 54 BPM | SYSTOLIC BLOOD PRESSURE: 90 MMHG | WEIGHT: 100 LBS | TEMPERATURE: 98.3 F | DIASTOLIC BLOOD PRESSURE: 58 MMHG | RESPIRATION RATE: 16 BRPM | OXYGEN SATURATION: 100 % | BODY MASS INDEX: 14 KG/M2 | HEIGHT: 71 IN

## 2025-03-20 DIAGNOSIS — R79.89 ABNORMAL LIVER FUNCTION TEST: Primary | ICD-10-CM

## 2025-03-20 DIAGNOSIS — K25.3 ACUTE PEPTIC ULCER OF STOMACH: ICD-10-CM

## 2025-03-20 DIAGNOSIS — R79.89 ABNORMAL LIVER FUNCTION TEST: ICD-10-CM

## 2025-03-20 DIAGNOSIS — R93.3 ABNORMAL CT SCAN, STOMACH: ICD-10-CM

## 2025-03-20 DIAGNOSIS — K29.20 ACUTE ALCOHOLIC GASTRITIS, PRESENCE OF BLEEDING UNSPECIFIED: ICD-10-CM

## 2025-03-20 PROCEDURE — 7100000009 HC PHASE TWO TIME - INITIAL BASE CHARGE

## 2025-03-20 PROCEDURE — 43239 EGD BIOPSY SINGLE/MULTIPLE: CPT | Performed by: INTERNAL MEDICINE

## 2025-03-20 PROCEDURE — 7100000010 HC PHASE TWO TIME - EACH INCREMENTAL 1 MINUTE

## 2025-03-20 PROCEDURE — 3700000001 HC GENERAL ANESTHESIA TIME - INITIAL BASE CHARGE

## 2025-03-20 PROCEDURE — 2500000004 HC RX 250 GENERAL PHARMACY W/ HCPCS (ALT 636 FOR OP/ED)

## 2025-03-20 PROCEDURE — 3700000002 HC GENERAL ANESTHESIA TIME - EACH INCREMENTAL 1 MINUTE

## 2025-03-20 RX ORDER — PROPOFOL 10 MG/ML
INJECTION, EMULSION INTRAVENOUS AS NEEDED
Status: DISCONTINUED | OUTPATIENT
Start: 2025-03-20 | End: 2025-03-20

## 2025-03-20 RX ORDER — OMEPRAZOLE 40 MG/1
40 CAPSULE, DELAYED RELEASE ORAL
Qty: 60 CAPSULE | Refills: 2 | Status: SHIPPED | OUTPATIENT
Start: 2025-03-20 | End: 2025-06-18

## 2025-03-20 RX ORDER — SODIUM CHLORIDE 9 MG/ML
10 INJECTION, SOLUTION INTRAVENOUS CONTINUOUS
Status: DISCONTINUED | OUTPATIENT
Start: 2025-03-20 | End: 2025-03-21 | Stop reason: HOSPADM

## 2025-03-20 RX ORDER — GLYCOPYRROLATE 0.2 MG/ML
INJECTION INTRAMUSCULAR; INTRAVENOUS AS NEEDED
Status: DISCONTINUED | OUTPATIENT
Start: 2025-03-20 | End: 2025-03-20

## 2025-03-20 RX ADMIN — GLYCOPYRROLATE 0.2 MG: 0.2 INJECTION INTRAMUSCULAR; INTRAVENOUS at 08:24

## 2025-03-20 RX ADMIN — PROPOFOL 50 MG: 10 INJECTION, EMULSION INTRAVENOUS at 08:25

## 2025-03-20 ASSESSMENT — PAIN SCALES - GENERAL
PAIN_LEVEL: 0
PAINLEVEL_OUTOF10: 0 - NO PAIN

## 2025-03-20 ASSESSMENT — PAIN - FUNCTIONAL ASSESSMENT
PAIN_FUNCTIONAL_ASSESSMENT: 0-10

## 2025-03-20 ASSESSMENT — COLUMBIA-SUICIDE SEVERITY RATING SCALE - C-SSRS
6. HAVE YOU EVER DONE ANYTHING, STARTED TO DO ANYTHING, OR PREPARED TO DO ANYTHING TO END YOUR LIFE?: NO
1. IN THE PAST MONTH, HAVE YOU WISHED YOU WERE DEAD OR WISHED YOU COULD GO TO SLEEP AND NOT WAKE UP?: NO
2. HAVE YOU ACTUALLY HAD ANY THOUGHTS OF KILLING YOURSELF?: NO

## 2025-03-20 NOTE — H&P
History Of Present Illness  Jeremi Monroy is a 35 y.o. male presenting for repeat EGD to document healing of ulcer.     Past Medical History  Past Medical History:   Diagnosis Date    (HFpEF) heart failure with preserved ejection fraction     Anxiety and depression     History of ETOH abuse     HTN (hypertension)     Seizure disorder (Multi)     alcoholic       Surgical History  History reviewed. No pertinent surgical history.     Social History  He reports that he has never smoked. He has never used smokeless tobacco. He reports that he does not currently use alcohol. He reports that he does not use drugs.    Family History  Family History   Problem Relation Name Age of Onset    No Known Problems Mother      Hypertension Father Arsh Monroy     Colon cancer Father Arsh Monroy     Cancer Father Arsh Monroy     Alcohol abuse Maternal Grandfather Joshua         Allergies  Patient has no known allergies.    Review of Systems     Physical Exam  Constitutional:       General: He is awake.      Appearance: Normal appearance.   HENT:      Head: Normocephalic and atraumatic.      Nose: Nose normal.      Mouth/Throat:      Mouth: Mucous membranes are moist.   Eyes:      Pupils: Pupils are equal, round, and reactive to light.   Neck:      Thyroid: No thyroid mass.      Trachea: Phonation normal.   Cardiovascular:      Rate and Rhythm: Normal rate and regular rhythm.   Pulmonary:      Effort: Pulmonary effort is normal. No respiratory distress.      Breath sounds: Normal air entry. No decreased breath sounds, wheezing, rhonchi or rales.   Abdominal:      General: Bowel sounds are normal. There is no distension.      Palpations: Abdomen is soft.      Tenderness: There is no abdominal tenderness.   Musculoskeletal:      Cervical back: Neck supple.      Right lower leg: No edema.      Left lower leg: No edema.   Skin:     General: Skin is warm.      Capillary Refill: Capillary refill takes less than 2 seconds.  "  Neurological:      General: No focal deficit present.      Mental Status: He is alert and oriented to person, place, and time. Mental status is at baseline.      Cranial Nerves: Cranial nerves 2-12 are intact.      Motor: Motor function is intact.   Psychiatric:         Attention and Perception: Attention and perception normal.         Mood and Affect: Mood normal.         Speech: Speech normal.         Behavior: Behavior normal.          Last Recorded Vitals  Blood pressure 83/56, pulse (!) 49, temperature 36.4 °C (97.6 °F), temperature source Temporal, resp. rate 16, height 1.803 m (5' 11\"), weight 45.4 kg (100 lb), SpO2 97%.    Relevant Results        Current Outpatient Medications   Medication Instructions    lisinopril 5 mg, oral, Daily (0630)    metoprolol succinate XL (TOPROL-XL) 25 mg, oral, 2 times daily    mirtazapine (REMERON) 15 mg, Nightly    multivitamin tablet 1 tablet, Daily    omeprazole (PRILOSEC) 40 mg, oral, 2 times daily before meals, Do not crush or chew.    QUEtiapine (SEROQUEL) 50 mg, Nightly    thiamine (VITAMIN B-1) 50 mg, Daily          Assessment/Plan   Assessment & Plan  Acute peptic ulcer of stomach        EGD for evaluation      Risk and benefits of the endoscopic procedure including bleeding perforation and infection were discussed with patient and they wish to proceed            Mike Pope DO    "

## 2025-03-20 NOTE — ANESTHESIA PREPROCEDURE EVALUATION
Patient: Jeremi Monroy    Procedure Information       Date/Time: 03/20/25 0845    Scheduled providers: Mike Pope DO    Procedure: EGD    Location: [facility]            Relevant Problems   Cardiac   (+) Hypertension      /Renal   (+) Hyponatremia       Clinical information reviewed:   Tobacco  Allergies  Meds   Med Hx  Surg Hx   Fam Hx  Soc Hx        NPO Detail:  NPO/Void Status  Date of Last Liquid: 03/19/25  Time of Last Liquid: 2200  Date of Last Solid: 03/19/25  Time of Last Solid: 2200  Time of Last Void: 0759         Physical Exam    Airway  Mallampati: II  TM distance: >3 FB  Neck ROM: full     Cardiovascular   Rhythm: regular  Rate: normal     Dental - normal exam     Pulmonary    Abdominal            Anesthesia Plan    History of general anesthesia?: no  History of complications of general anesthesia?: no    ASA 2     MAC     intravenous induction   Anesthetic plan and risks discussed with patient.

## 2025-03-20 NOTE — ANESTHESIA POSTPROCEDURE EVALUATION
Patient: Jeremi Monroy    Procedure Summary       Date: 03/20/25 Room / Location: [facility]    Anesthesia Start: 0822 Anesthesia Stop: 0840    Procedure: EGD Diagnosis: Acute peptic ulcer of stomach    Scheduled Providers: Mike Pope DO Responsible Provider: KUMAR Hugo    Anesthesia Type: MAC ASA Status: 2            Anesthesia Type: MAC    Vitals Value Taken Time   BP 90/56 03/20/25 0847   Temp 36.2 °C (97.2 °F) 03/20/25 0837   Pulse 48 03/20/25 0847   Resp 16 03/20/25 0847   SpO2 99 % 03/20/25 0847       Anesthesia Post Evaluation    Patient location during evaluation: bedside  Patient participation: complete - patient participated  Level of consciousness: awake  Pain score: 0  Pain management: adequate  Airway patency: patent  Cardiovascular status: acceptable  Respiratory status: acceptable  Hydration status: acceptable  Postoperative Nausea and Vomiting: none        There were no known notable events for this encounter.

## 2025-03-31 LAB
LAB AP ASR DISCLAIMER: NORMAL
LABORATORY COMMENT REPORT: NORMAL
PATH REPORT.ADDENDUM SPEC: NORMAL
PATH REPORT.FINAL DX SPEC: NORMAL
PATH REPORT.GROSS SPEC: NORMAL
PATH REPORT.RELEVANT HX SPEC: NORMAL
PATH REPORT.TOTAL CANCER: NORMAL

## 2025-04-15 LAB
ALBUMIN SERPL-MCNC: 4.5 G/DL (ref 3.6–5.1)
ALP SERPL-CCNC: 129 U/L (ref 36–130)
ALT SERPL-CCNC: 19 U/L (ref 9–46)
ANION GAP SERPL CALCULATED.4IONS-SCNC: 8 MMOL/L (CALC) (ref 7–17)
AST SERPL-CCNC: 19 U/L (ref 10–40)
BASOPHILS # BLD AUTO: 22 CELLS/UL (ref 0–200)
BASOPHILS NFR BLD AUTO: 0.5 %
BILIRUB SERPL-MCNC: 0.3 MG/DL (ref 0.2–1.2)
BUN SERPL-MCNC: 15 MG/DL (ref 7–25)
CALCIUM SERPL-MCNC: 8.9 MG/DL (ref 8.6–10.3)
CHLORIDE SERPL-SCNC: 92 MMOL/L (ref 98–110)
CO2 SERPL-SCNC: 26 MMOL/L (ref 20–32)
CREAT SERPL-MCNC: 0.47 MG/DL (ref 0.6–1.26)
EGFRCR SERPLBLD CKD-EPI 2021: 141 ML/MIN/1.73M2
EOSINOPHIL # BLD AUTO: 60 CELLS/UL (ref 15–500)
EOSINOPHIL NFR BLD AUTO: 1.4 %
ERYTHROCYTE [DISTWIDTH] IN BLOOD BY AUTOMATED COUNT: 11.9 % (ref 11–15)
GLUCOSE SERPL-MCNC: 85 MG/DL (ref 65–139)
HCT VFR BLD AUTO: 31.8 % (ref 38.5–50)
HGB BLD-MCNC: 10.7 G/DL (ref 13.2–17.1)
LYMPHOCYTES # BLD AUTO: 1062 CELLS/UL (ref 850–3900)
LYMPHOCYTES NFR BLD AUTO: 24.7 %
MCH RBC QN AUTO: 32.4 PG (ref 27–33)
MCHC RBC AUTO-ENTMCNC: 33.6 G/DL (ref 32–36)
MCV RBC AUTO: 96.4 FL (ref 80–100)
MONOCYTES # BLD AUTO: 516 CELLS/UL (ref 200–950)
MONOCYTES NFR BLD AUTO: 12 %
NEUTROPHILS # BLD AUTO: 2640 CELLS/UL (ref 1500–7800)
NEUTROPHILS NFR BLD AUTO: 61.4 %
PLATELET # BLD AUTO: 173 THOUSAND/UL (ref 140–400)
PMV BLD REES-ECKER: 10 FL (ref 7.5–12.5)
POTASSIUM SERPL-SCNC: 4.2 MMOL/L (ref 3.5–5.3)
PROT SERPL-MCNC: 7.3 G/DL (ref 6.1–8.1)
RBC # BLD AUTO: 3.3 MILLION/UL (ref 4.2–5.8)
SODIUM SERPL-SCNC: 126 MMOL/L (ref 135–146)
WBC # BLD AUTO: 4.3 THOUSAND/UL (ref 3.8–10.8)

## 2025-05-05 ENCOUNTER — PATIENT MESSAGE (OUTPATIENT)
Facility: CLINIC | Age: 35
End: 2025-05-05

## 2025-05-05 ENCOUNTER — APPOINTMENT (OUTPATIENT)
Facility: CLINIC | Age: 35
End: 2025-05-05
Payer: MEDICARE

## 2025-05-05 VITALS
HEART RATE: 85 BPM | HEIGHT: 71 IN | BODY MASS INDEX: 13.77 KG/M2 | SYSTOLIC BLOOD PRESSURE: 96 MMHG | WEIGHT: 98.4 LBS | DIASTOLIC BLOOD PRESSURE: 52 MMHG | OXYGEN SATURATION: 97 %

## 2025-05-05 DIAGNOSIS — E87.1 HYPONATREMIA: ICD-10-CM

## 2025-05-05 DIAGNOSIS — K25.0 ACUTE GASTRIC ULCER WITH HEMORRHAGE: Primary | ICD-10-CM

## 2025-05-05 DIAGNOSIS — I10 HYPERTENSION, UNSPECIFIED TYPE: ICD-10-CM

## 2025-05-05 DIAGNOSIS — K83.9 BILE DUCT ABNORMALITY: ICD-10-CM

## 2025-05-05 DIAGNOSIS — R93.3 ABNORMAL CT SCAN, STOMACH: ICD-10-CM

## 2025-05-05 PROCEDURE — 3074F SYST BP LT 130 MM HG: CPT | Performed by: NURSE PRACTITIONER

## 2025-05-05 PROCEDURE — 99214 OFFICE O/P EST MOD 30 MIN: CPT | Performed by: NURSE PRACTITIONER

## 2025-05-05 PROCEDURE — 3078F DIAST BP <80 MM HG: CPT | Performed by: NURSE PRACTITIONER

## 2025-05-05 PROCEDURE — 3008F BODY MASS INDEX DOCD: CPT | Performed by: NURSE PRACTITIONER

## 2025-05-05 PROCEDURE — 1036F TOBACCO NON-USER: CPT | Performed by: NURSE PRACTITIONER

## 2025-05-05 RX ORDER — LISINOPRIL 5 MG/1
5 TABLET ORAL
Qty: 90 TABLET | Refills: 0 | Status: SHIPPED | OUTPATIENT
Start: 2025-05-05

## 2025-05-05 RX ORDER — OMEPRAZOLE 40 MG/1
40 CAPSULE, DELAYED RELEASE ORAL
Qty: 90 CAPSULE | Refills: 1 | Status: SHIPPED | OUTPATIENT
Start: 2025-05-05 | End: 2025-11-01

## 2025-05-05 ASSESSMENT — ENCOUNTER SYMPTOMS
BRUISES/BLEEDS EASILY: 0
DIFFICULTY URINATING: 0
FEVER: 0
DIZZINESS: 0
JOINT SWELLING: 0
CONFUSION: 0
TROUBLE SWALLOWING: 0
SHORTNESS OF BREATH: 0
CHILLS: 0
WEAKNESS: 0
PALPITATIONS: 0
COUGH: 0
ARTHRALGIAS: 0
SORE THROAT: 0
WOUND: 0
ROS GI COMMENTS: SEE HPI
ADENOPATHY: 0

## 2025-05-05 NOTE — PATIENT INSTRUCTIONS
Please schedule your ultrasound; call [phone] to schedule, or you can schedule on MyChart     Continue the omeprazole 40mg twice a day     You have been scheduled for an upper endoscopy (EGD).  You were given instructions for preparing for this test in the office today.  If you have questions about these instructions, please call my office at [phone].    After your procedure, you can expect to talk to the performing physician to go over the results of the procedure.    You were also given information regarding the schedule for your procedure including the time that you need to arrive to the endoscopy unit.  You will also be contacted about 1 week prior to your procedure to confirm the final arrival time.  If you have questions about this or if you need to cancel or change this appointment please call my office at [phone].

## 2025-05-05 NOTE — PROGRESS NOTES
Subjective   Patient ID: Jeremi Monroy is a 35 y.o. male with PMH of EToH abuse, HFpEF, seizures (alcoholic), anxiety, depression who was referred by No ref. provider found for Follow-up (EGD/path 3/20/25).     Patient's PCP is MICHAEL Braswell-CNP     HPI  Patient initially seen by GI 1/7/25 for an ER follow up of anemia, near-syncope, and alcoholic gastritis. H&H was 6.7/19.6 on initial labs. Imaging showed wall thickening of the stomach and focal narrowing of the proximal gastric lumen, with moderate distention of the distal stomach and enlarged gastrohepatic lymph nodes. He was given 1 unit of PRBC and admission was recommended, however, patient left AMA. He was given a prescription for omeprazole 20mg daily. He was seen in the GI office a few days later and omeprazole was increased to 40mg BID and EGD was ordered. EGD done on 1/16/25 that showed grade D esophagitis, gastric ulcer, and normal duodenum. Pathology showed no celiac disease, but gastric biopsy showed gastric mucosa with focal erosion, acute and chronic inflammation, fibroinflammatory exudate, and atypical granular tissue; negative for H. Pylori. Repeat EGD was done 3/20/25 that showed grade B esophagitis, gastric ulcer, and normal duodenum. Pathology showed fibrinopurulent debris consistent with ulcer, minute fragment of foveolar-lined mucosa with inflammation.     Patient is here today for follow up. He is feeling significantly better than he did in January. He has occasional chest/epigastric discomfort about once every 2 weeks or so, but otherwise has been feeling well. He is taking his omeprazole 40mg BID as directed. Not taking NSAIDs, no smoking, and no alcohol. His weight has been fluctuating between 95-107lbs. He denies N/V, dysphagia, odynophagia, melena, hematemesis, diarrhea, constipation, or hematochezia.     He has a history of alcohol abuse and drank 1.75L or more of whiskey per day for many years in his 20s. At one  point, he was on hospice but recovered after he quit drinking. He developed alcoholic cardiomyopathy and follows with cardiology (Jodee). Recent echocardiogram showed a normal EF.     Summary of endoscopies:  - EGD 1/2025: grade D esophagitis, gastric ulcer, and normal duodenum. Pathology showed no celiac disease, but gastric biopsy showed gastric mucosa with focal erosion, acute and chronic inflammation, fibroinflammatory exudate, and atypical granular tissue; negative for H. Pylori  - EGD 3/2025: grade B esophagitis, gastric ulcer, and normal duodenum. Pathology showed fibrinopurulent debris consistent with ulcer, minute fragment of foveolar-lined mucosa with inflammation    Social Hx:  Tobacco: none   Etoh: prior heavy EToH (1.75L whiskey/day or more); sober x2 years   Recreational drug use: none  NSAIDs: rare       Family Hx:  Colon cancer -- father     Review of Systems:  Review of Systems   Constitutional:  Negative for chills and fever.   HENT:  Negative for sore throat and trouble swallowing.    Respiratory:  Negative for cough and shortness of breath.    Cardiovascular:  Negative for chest pain and palpitations.   Gastrointestinal:         SEE HPI   Endocrine: Negative for cold intolerance and heat intolerance.   Genitourinary:  Negative for difficulty urinating.   Musculoskeletal:  Negative for arthralgias and joint swelling.   Skin:  Negative for rash and wound.   Neurological:  Negative for dizziness and weakness.   Hematological:  Negative for adenopathy. Does not bruise/bleed easily.   Psychiatric/Behavioral:  Negative for confusion.         Medications:  Prior to Admission medications    Medication Sig Start Date End Date Taking? Authorizing Provider   lisinopril 5 mg tablet Take 1 tablet (5 mg) by mouth early in the morning.. 9/20/24   Yajaira Ellsworth MD   metoprolol succinate XL (Toprol-XL) 25 mg 24 hr tablet Take 1 tablet (25 mg) by mouth 2 times a day. 9/20/24   Yajaira Ellsworth MD    mirtazapine (Remeron) 15 mg tablet Take 1 tablet (15 mg) by mouth once daily at bedtime.    Historical Provider, MD   multivitamin tablet Take 1 tablet by mouth once daily.    Historical Provider, MD   omeprazole (PriLOSEC) 20 mg DR capsule Take 1 capsule (20 mg) by mouth once daily. Do not crush or chew. 1/3/25 2/2/25  Jeremy Scott, APRN-CNP   QUEtiapine (SEROquel) 50 mg tablet Take 1 tablet (50 mg) by mouth once daily at bedtime. 7/7/24   Historical Provider, MD   thiamine (Vitamin B-1) 50 mg tablet Take 1 tablet (50 mg) by mouth once daily.    Historical Provider, MD       Allergies:  Patient has no known allergies.    Past Medical History:  He has a past medical history of (HFpEF) heart failure with preserved ejection fraction, Anxiety and depression, History of ETOH abuse, HTN (hypertension), and Seizure disorder (Multi).    Past Surgical History:  He has no past surgical history on file.    Social History:  He reports that he has never smoked. He has never used smokeless tobacco. He reports that he does not currently use alcohol. He reports that he does not use drugs.    Objective   Physical exam:  Physical Exam  Constitutional:       General: He is not in acute distress.     Comments: Cachexia    HENT:      Mouth/Throat:      Mouth: Mucous membranes are moist.      Comments: pink  Eyes:      Conjunctiva/sclera: Conjunctivae normal.      Pupils: Pupils are equal, round, and reactive to light.   Cardiovascular:      Rate and Rhythm: Normal rate and regular rhythm.      Heart sounds: No murmur heard.  Pulmonary:      Effort: Pulmonary effort is normal.      Breath sounds: Normal breath sounds.   Abdominal:      General: Bowel sounds are normal. There is no distension.      Palpations: Abdomen is soft.      Tenderness: There is no abdominal tenderness. There is no guarding.   Skin:     General: Skin is warm and dry.      Coloration: Skin is not jaundiced.   Neurological:      Mental Status: He is alert and  oriented to person, place, and time.   Psychiatric:         Mood and Affect: Mood normal.         Behavior: Behavior normal.          Assessment/Plan     Gastric ulcer, esophagitis   Initially had grade D esophagitis and gastric ulcer on EGD. Has been on BID PPI since January. Most recent EGD in March 2025 showed grade B esophagitis and still had gastric ulcer. He is taking medication as directed, and denies tobacco, alcohol, and NSAIDs. Will recheck EGD in June. If gastric ulcer remains non-healed, will need to start considering referral to surgery.   - continue BID PPI  - Repeat EGD to check gastric ulcer healing       History of heavy EToH; sober currently, abnormal CT liver   LFTs have trended down over a couple of years; CT previously showed slighly ill-defined mild irregular linear hypoattenuation of liver. Will check RUQ US.   - RUQ US       Follow up after EGD        MICHAEL Izquierdo-CNP

## 2025-05-09 ENCOUNTER — HOSPITAL ENCOUNTER (OUTPATIENT)
Dept: RADIOLOGY | Facility: HOSPITAL | Age: 35
Discharge: HOME | End: 2025-05-09
Payer: MEDICARE

## 2025-05-09 DIAGNOSIS — K83.9 BILE DUCT ABNORMALITY: ICD-10-CM

## 2025-05-09 PROCEDURE — 76705 ECHO EXAM OF ABDOMEN: CPT | Performed by: RADIOLOGY

## 2025-05-09 PROCEDURE — 76705 ECHO EXAM OF ABDOMEN: CPT

## 2025-05-16 DIAGNOSIS — K83.9 BILE DUCT ABNORMALITY: Primary | ICD-10-CM

## 2025-05-16 NOTE — PROGRESS NOTES
Ultrasound was nondiagnostic and recommended MRI for follow up. Will order MRCP to evaluate due to prior findings:     Addendum    Critical value: These findings were discussed with Dr Jeremy Lyons  on 1/3/2025 10:19 AM.  Additional history provided was EtOH use.  Follow-up upper endoscopy  can be performed for more definitive evaluation.  Abdominal ultrasound  can also be performed to further evaluate the abnormal liver findings.  Signed by Sha Soliman MD   Addended by Sha Soliman MD on 1/3/2025 10:22 AM     Study Result    Narrative & Impression   STUDY:  CT Abdomen and Pelvis with IV Contrast; 1/03/2025 9:14 AM  INDICATION:  Chest pain.  Low hemoglobin.  COMPARISON:  CXR 1/3/2025.  ACCESSION NUMBER(S):  [id number]  ORDERING CLINICIAN:  JEREMY LYONS  TECHNIQUE:  CT of the abdomen and pelvis was performed.  Contiguous axial images  were obtained at 3 mm slice thickness through the abdomen and pelvis.   Coronal and sagittal reconstructions at 3 mm slice thickness were  performed.  Omnipaque 350 75 mL was administered intravenously.    FINDINGS:  LOWER CHEST:  No cardiomegaly.  No pericardial effusion.  Lung bases are clear.     ABDOMEN:     LIVER: No hepatomegaly.  Smooth surface contour.   BILE DUCTS:  There is slightly ill-defined mild irregular linear hypoattenuation  involving the superior, mid, and inferior aspect of the right hepatic  lobe with differential to include abnormal intrahepatic biliary  dilatation.  No abnormal extrahepatic biliary dilatation.  GALLBLADDER:  The gallbladder is partially collapsed limiting evaluation.  No  radiopaque intraluminal gallstones.  STOMACH:  There is prominent diffuse hypoattenuated wall thickening and edema  involving the gastric fundus and proximal lesser and greater  curvatures of the stomach associated with moderate focal narrowing of  the proximal gastric lumen.  There is moderate distention of the more  distal stomach associated with air and  recently ingested material.  There are several enlarged gastrohepatic lymph nodes measuring up to  1.0 cm in short axis.  PANCREAS:  No masses or ductal dilatation.     SPLEEN:  No splenomegaly or focal splenic lesion.     ADRENAL GLANDS:  No thickening or nodules.     KIDNEYS AND URETERS:  Kidneys are normal in size and location.  There is mild fullness of  the proximal renal collecting system and renal pelvis bilaterally.  No  discrete renal or ureteral calculi.     PELVIS:     BLADDER:  Moderately distended with moderate to prominent diffuse wall  thickening.     REPRODUCTIVE ORGANS:  Prostate gland appears mildly enlarged with small central parenchymal  calcifications.     BOWEL:  There is extensive fecal retention throughout the colon suggestive for  constipation.  Appendix is unremarkable.  Mild generalized air, fluid,  and fecal filled distention of small bowel.  No evidence of bowel  obstruction.     VESSELS:  No abnormalities identified.  Abdominal aorta is normal in caliber.      PERITONEUM/RETROPERITONEUM/LYMPH NODES:  No free fluid.  No pneumoperitoneum.  There are mildly enlarged gastrohepatic lymph nodes measuring up to  1.0 cm in short axis.  ABDOMINAL WALL:  No abnormalities identified.  SOFT TISSUES:   No abnormalities identified.     BONES:  Mild right-sided curvature of the thoracic and lumbar spine.  No acute  fracture or aggressive osseous lesion.  IMPRESSION:  1.  There is prominent diffuse hypoattenuated wall thickening and  edema involving the gastric fundus and proximal lesser and greater  curvatures of the stomach, associated with moderate narrowing of the  proximal gastric lumen.  Differential considerations would include  severe focal peptic ulcer disease and gastritis, however, an  underlying proximal gastric mass or neoplasm cannot be excluded.   There are mildly enlarged adjacent gastrohepatic lymph nodes.  2.  Mild irregular peripheral linear hyperattenuation primarily noted  within  the right hepatic lobe which may reflect intrahepatic biliary  dilatation.  3.  There is extensive fecal retention within the colon and small  bowel suggestive for severe constipation.  4.  Urinary bladder is mildly distended with moderate diffuse wall  thickening.  Recommend correlation with urinalysis to exclude  cystitis.  Signed by Sha Soliman MD         Study Result    Narrative & Impression   Interpreted By:  Stefano Carlin,   STUDY:  US RIGHT UPPER QUADRANT;  5/9/2025 7:50 am      INDICATION:  Signs/Symptoms:questionable bile duct abnormality on ct scan.      COMPARISON:  CT dated 01/03/2025      ACCESSION NUMBER(S):  [id number]      ORDERING CLINICIAN:  AVELINA HADDAD      TECHNIQUE:  Multiple images of the right upper quadrant were obtained.  Gray  scale, color Doppler and spectral Doppler waveform analysis was  performed.      FINDINGS:  LIVER:  17.7 cm in length. No focal abnormality. Increased hepatic  echogenicity. Small volume ascites. The abnormality seen in the  periphery of the right hepatic lobe on prior CT are not well assessed  sonographically, consider further assessment with MRI.      GALLBLADDER:  No gallstones. Gallbladder sludge. No gallbladder wall thickening.  Negative sonographic Salinas's sign.      BILIARY SYSTEM:  Nondilated.      PANCREAS:  Visualized portion of the body unremarkable. Remainder obscured by  overlying bowel gas.      RIGHT KIDNEY:  11.5 cm in length. No hydronephrosis. No focal renal abnormality.      IMPRESSION:  Hepatomegaly. Increased hepatic echogenicity may be seen with hepatic  steatosis or hepatocellular disease. Small volume ascites.      The abnormality seen in the periphery of the right hepatic lobe on  prior CT are not well assessed sonographically, consider further  assessment with MRI.      Gallbladder sludge.      Signed by: Stefano Carlin 5/10/2025 7:57 AM  Dictation workstation:   [id number]

## 2025-06-12 ENCOUNTER — HOSPITAL ENCOUNTER (OUTPATIENT)
Dept: RADIOLOGY | Facility: HOSPITAL | Age: 35
Discharge: HOME | End: 2025-06-12
Payer: COMMERCIAL

## 2025-06-12 DIAGNOSIS — K83.9 BILE DUCT ABNORMALITY: ICD-10-CM

## 2025-06-12 PROCEDURE — 2550000001 HC RX 255 CONTRASTS: Performed by: NURSE PRACTITIONER

## 2025-06-12 PROCEDURE — 76376 3D RENDER W/INTRP POSTPROCES: CPT | Performed by: RADIOLOGY

## 2025-06-12 PROCEDURE — 74183 MRI ABD W/O CNTR FLWD CNTR: CPT | Performed by: RADIOLOGY

## 2025-06-12 PROCEDURE — A9575 INJ GADOTERATE MEGLUMI 0.1ML: HCPCS | Performed by: NURSE PRACTITIONER

## 2025-06-12 PROCEDURE — 74183 MRI ABD W/O CNTR FLWD CNTR: CPT

## 2025-06-12 RX ORDER — GADOTERATE MEGLUMINE 376.9 MG/ML
0.2 INJECTION INTRAVENOUS
Status: COMPLETED | OUTPATIENT
Start: 2025-06-12 | End: 2025-06-12

## 2025-06-12 RX ADMIN — GADOTERATE MEGLUMINE 9.5 ML: 376.9 INJECTION INTRAVENOUS at 07:06

## 2025-06-13 DIAGNOSIS — R93.2 ABNORMAL MRI, LIVER: Primary | ICD-10-CM

## 2025-06-13 DIAGNOSIS — R74.8 ELEVATED ALKALINE PHOSPHATASE LEVEL: ICD-10-CM

## 2025-06-13 DIAGNOSIS — F10.11 HISTORY OF ETOH ABUSE: ICD-10-CM

## 2025-06-13 NOTE — H&P (VIEW-ONLY)
MRI showing diffuse liver abnormality. Has had elevated alk phos. History of significant etoh abuse and etoh hepatitis. Has been sober for 2 years.

## 2025-06-24 ENCOUNTER — TELEPHONE (OUTPATIENT)
Facility: CLINIC | Age: 35
End: 2025-06-24
Payer: COMMERCIAL

## 2025-06-24 DIAGNOSIS — R93.3 ABNORMAL CT SCAN, STOMACH: ICD-10-CM

## 2025-06-24 RX ORDER — OMEPRAZOLE 40 MG/1
40 CAPSULE, DELAYED RELEASE ORAL
Qty: 90 CAPSULE | Refills: 1 | Status: SHIPPED | OUTPATIENT
Start: 2025-06-24 | End: 2025-06-27

## 2025-06-25 ENCOUNTER — ANESTHESIA EVENT (OUTPATIENT)
Dept: GASTROENTEROLOGY | Facility: HOSPITAL | Age: 35
End: 2025-06-25
Payer: COMMERCIAL

## 2025-06-27 ENCOUNTER — ANESTHESIA (OUTPATIENT)
Dept: GASTROENTEROLOGY | Facility: HOSPITAL | Age: 35
End: 2025-06-27
Payer: COMMERCIAL

## 2025-06-27 ENCOUNTER — HOSPITAL ENCOUNTER (OUTPATIENT)
Dept: GASTROENTEROLOGY | Facility: HOSPITAL | Age: 35
Discharge: HOME | End: 2025-06-27
Payer: COMMERCIAL

## 2025-06-27 VITALS
SYSTOLIC BLOOD PRESSURE: 83 MMHG | HEART RATE: 49 BPM | RESPIRATION RATE: 16 BRPM | TEMPERATURE: 97.3 F | DIASTOLIC BLOOD PRESSURE: 52 MMHG | OXYGEN SATURATION: 100 %

## 2025-06-27 DIAGNOSIS — K25.0 ACUTE GASTRIC ULCER WITH HEMORRHAGE: ICD-10-CM

## 2025-06-27 DIAGNOSIS — R93.3 ABNORMAL CT SCAN, STOMACH: ICD-10-CM

## 2025-06-27 PROCEDURE — 3700000002 HC GENERAL ANESTHESIA TIME - EACH INCREMENTAL 1 MINUTE

## 2025-06-27 PROCEDURE — 7100000010 HC PHASE TWO TIME - EACH INCREMENTAL 1 MINUTE

## 2025-06-27 PROCEDURE — 7100000009 HC PHASE TWO TIME - INITIAL BASE CHARGE

## 2025-06-27 PROCEDURE — 43239 EGD BIOPSY SINGLE/MULTIPLE: CPT | Performed by: INTERNAL MEDICINE

## 2025-06-27 PROCEDURE — 2500000004 HC RX 250 GENERAL PHARMACY W/ HCPCS (ALT 636 FOR OP/ED): Mod: JW | Performed by: NURSE ANESTHETIST, CERTIFIED REGISTERED

## 2025-06-27 PROCEDURE — 3700000001 HC GENERAL ANESTHESIA TIME - INITIAL BASE CHARGE

## 2025-06-27 PROCEDURE — 2500000004 HC RX 250 GENERAL PHARMACY W/ HCPCS (ALT 636 FOR OP/ED): Performed by: INTERNAL MEDICINE

## 2025-06-27 RX ORDER — FENTANYL CITRATE 50 UG/ML
INJECTION, SOLUTION INTRAMUSCULAR; INTRAVENOUS AS NEEDED
Status: DISCONTINUED | OUTPATIENT
Start: 2025-06-27 | End: 2025-06-27

## 2025-06-27 RX ORDER — SODIUM CHLORIDE 9 MG/ML
20 INJECTION, SOLUTION INTRAVENOUS CONTINUOUS
Status: ACTIVE | OUTPATIENT
Start: 2025-06-27 | End: 2025-06-27

## 2025-06-27 RX ORDER — LIDOCAINE HYDROCHLORIDE 20 MG/ML
INJECTION, SOLUTION EPIDURAL; INFILTRATION; INTRACAUDAL; PERINEURAL AS NEEDED
Status: DISCONTINUED | OUTPATIENT
Start: 2025-06-27 | End: 2025-06-27

## 2025-06-27 RX ORDER — OMEPRAZOLE 40 MG/1
40 CAPSULE, DELAYED RELEASE ORAL
Qty: 30 CAPSULE | Refills: 3 | Status: SHIPPED | OUTPATIENT
Start: 2025-06-27

## 2025-06-27 RX ORDER — PROPOFOL 10 MG/ML
INJECTION, EMULSION INTRAVENOUS AS NEEDED
Status: DISCONTINUED | OUTPATIENT
Start: 2025-06-27 | End: 2025-06-27

## 2025-06-27 RX ADMIN — PROPOFOL 20 MG: 10 INJECTION, EMULSION INTRAVENOUS at 07:36

## 2025-06-27 RX ADMIN — PROPOFOL 50 MG: 10 INJECTION, EMULSION INTRAVENOUS at 07:33

## 2025-06-27 RX ADMIN — SODIUM CHLORIDE 20 ML/HR: 0.9 INJECTION, SOLUTION INTRAVENOUS at 07:09

## 2025-06-27 RX ADMIN — LIDOCAINE HYDROCHLORIDE 50 MG: 20 INJECTION, SOLUTION EPIDURAL; INFILTRATION; INTRACAUDAL; PERINEURAL at 07:33

## 2025-06-27 RX ADMIN — PROPOFOL 30 MG: 10 INJECTION, EMULSION INTRAVENOUS at 07:34

## 2025-06-27 RX ADMIN — FENTANYL CITRATE 25 MCG: 50 INJECTION INTRAMUSCULAR; INTRAVENOUS at 07:33

## 2025-06-27 SDOH — HEALTH STABILITY: MENTAL HEALTH: CURRENT SMOKER: 0

## 2025-06-27 ASSESSMENT — PAIN - FUNCTIONAL ASSESSMENT
PAIN_FUNCTIONAL_ASSESSMENT: 0-10
PAIN_FUNCTIONAL_ASSESSMENT: FLACC (FACE, LEGS, ACTIVITY, CRY, CONSOLABILITY)
PAIN_FUNCTIONAL_ASSESSMENT: 0-10
PAIN_FUNCTIONAL_ASSESSMENT: FLACC (FACE, LEGS, ACTIVITY, CRY, CONSOLABILITY)
PAIN_FUNCTIONAL_ASSESSMENT: 0-10

## 2025-06-27 ASSESSMENT — COLUMBIA-SUICIDE SEVERITY RATING SCALE - C-SSRS
6. HAVE YOU EVER DONE ANYTHING, STARTED TO DO ANYTHING, OR PREPARED TO DO ANYTHING TO END YOUR LIFE?: NO
2. HAVE YOU ACTUALLY HAD ANY THOUGHTS OF KILLING YOURSELF?: NO
1. IN THE PAST MONTH, HAVE YOU WISHED YOU WERE DEAD OR WISHED YOU COULD GO TO SLEEP AND NOT WAKE UP?: NO

## 2025-06-27 ASSESSMENT — PAIN SCALES - GENERAL
PAINLEVEL_OUTOF10: 0 - NO PAIN
PAIN_LEVEL: 0

## 2025-06-27 NOTE — ANESTHESIA PREPROCEDURE EVALUATION
Patient: Jeremi Monroy    Procedure Information       Date/Time: 06/27/25 0730    Scheduled providers: Mike Pope DO    Procedure: EGD    Location: [facility]            Relevant Problems   Anesthesia (within normal limits)      Cardiac   (+) Hypertension      /Renal   (+) Hyponatremia       Clinical information reviewed:   Tobacco  Allergies  Meds   Med Hx  Surg Hx   Fam Hx  Soc Hx        NPO Detail:  NPO/Void Status  Carbohydrate Drink Given Prior to Surgery? : N  Date of Last Liquid: 06/27/25  Time of Last Liquid: 0530  Date of Last Solid: 06/26/25  Time of Last Solid: 2000  Last Intake Type: Clear fluids  Time of Last Void: 0652         Physical Exam    Airway  Mallampati: II  TM distance: >3 FB  Neck ROM: full  Mouth opening: 3 or more finger widths     Cardiovascular - normal exam   Dental   Comments: Poor dentition, missing multiple teeth     Pulmonary - normal exam   Abdominal (+) scaphoid             Anesthesia Plan    History of general anesthesia?: yes  History of complications of general anesthesia?: no    ASA 2     MAC     The patient is not a current smoker.    intravenous induction   Anesthetic plan and risks discussed with patient.    Plan discussed with CRNA.

## 2025-06-27 NOTE — ANESTHESIA POSTPROCEDURE EVALUATION
Patient: Jeremi Monroy    Procedure Summary       Date: 06/27/25 Room / Location: [facility]    Anesthesia Start: 0728 Anesthesia Stop: 0748    Procedure: EGD Diagnosis: Acute gastric ulcer with hemorrhage    Scheduled Providers: Mike Pope DO Responsible Provider: KUMAR Reyna    Anesthesia Type: MAC ASA Status: 2            Anesthesia Type: MAC    Vitals Value Taken Time   BP 77/46 06/27/25 07:48   Temp 97.2 06/27/25 07:48   Pulse 47 06/27/25 07:48   Resp 14 06/27/25 07:48   SpO2 100% 06/27/25 07:48       Anesthesia Post Evaluation    Patient location during evaluation: PACU  Patient participation: complete - patient cannot participate  Level of consciousness: responsive to verbal stimuli and responsive to light touch  Pain score: 0  Pain management: adequate  Airway patency: patent  Cardiovascular status: hypotensive and stable (not far from baseline, fluids infusing)  Respiratory status: acceptable, face mask and spontaneous ventilation  Hydration status: acceptable  Postoperative Nausea and Vomiting: none        There were no known notable events for this encounter.

## 2025-07-03 LAB
LABORATORY COMMENT REPORT: NORMAL
PATH REPORT.FINAL DX SPEC: NORMAL
PATH REPORT.GROSS SPEC: NORMAL
PATH REPORT.RELEVANT HX SPEC: NORMAL
PATH REPORT.TOTAL CANCER: NORMAL

## 2025-07-07 ENCOUNTER — APPOINTMENT (OUTPATIENT)
Dept: HEMATOLOGY/ONCOLOGY | Facility: CLINIC | Age: 35
End: 2025-07-07
Payer: COMMERCIAL

## 2025-07-14 ENCOUNTER — OFFICE VISIT (OUTPATIENT)
Dept: HEMATOLOGY/ONCOLOGY | Facility: CLINIC | Age: 35
End: 2025-07-14
Payer: MEDICARE

## 2025-07-14 VITALS
OXYGEN SATURATION: 100 % | HEART RATE: 58 BPM | DIASTOLIC BLOOD PRESSURE: 62 MMHG | RESPIRATION RATE: 16 BRPM | TEMPERATURE: 97 F | BODY MASS INDEX: 13.69 KG/M2 | HEIGHT: 71 IN | WEIGHT: 97.77 LBS | SYSTOLIC BLOOD PRESSURE: 88 MMHG

## 2025-07-14 DIAGNOSIS — R53.82 CHRONIC FATIGUE, UNSPECIFIED: ICD-10-CM

## 2025-07-14 DIAGNOSIS — D50.8 OTHER IRON DEFICIENCY ANEMIA: Primary | ICD-10-CM

## 2025-07-14 DIAGNOSIS — D64.9 FATIGUE ASSOCIATED WITH ANEMIA: ICD-10-CM

## 2025-07-14 DIAGNOSIS — D72.818 OTHER DECREASED WHITE BLOOD CELL (WBC) COUNT: ICD-10-CM

## 2025-07-14 LAB
ALBUMIN SERPL BCP-MCNC: 4.7 G/DL (ref 3.4–5)
ALP SERPL-CCNC: 140 U/L (ref 33–120)
ALT SERPL W P-5'-P-CCNC: 24 U/L (ref 10–52)
ANION GAP SERPL CALC-SCNC: 7 MMOL/L (ref 10–20)
AST SERPL W P-5'-P-CCNC: 18 U/L (ref 9–39)
BASOPHILS # BLD AUTO: 0.02 X10*3/UL (ref 0–0.1)
BASOPHILS NFR BLD AUTO: 0.5 %
BILIRUB SERPL-MCNC: 0.5 MG/DL (ref 0–1.2)
BUN SERPL-MCNC: 15 MG/DL (ref 6–23)
CALCIUM SERPL-MCNC: 9.3 MG/DL (ref 8.6–10.3)
CHLORIDE SERPL-SCNC: 94 MMOL/L (ref 98–107)
CO2 SERPL-SCNC: 29 MMOL/L (ref 21–32)
CREAT SERPL-MCNC: 0.54 MG/DL (ref 0.5–1.3)
EGFRCR SERPLBLD CKD-EPI 2021: >90 ML/MIN/1.73M*2
EOSINOPHIL # BLD AUTO: 0.07 X10*3/UL (ref 0–0.7)
EOSINOPHIL NFR BLD AUTO: 1.9 %
ERYTHROCYTE [DISTWIDTH] IN BLOOD BY AUTOMATED COUNT: 13.6 % (ref 11.5–14.5)
FERRITIN SERPL-MCNC: 122 NG/ML (ref 20–300)
FOLATE SERPL-MCNC: >24 NG/ML
GLUCOSE SERPL-MCNC: 86 MG/DL (ref 74–99)
HAV IGM SER QL: NONREACTIVE
HBV CORE IGM SER QL: NONREACTIVE
HBV SURFACE AG SERPL QL IA: NONREACTIVE
HCT VFR BLD AUTO: 32.7 % (ref 41–52)
HCV AB SER QL: NONREACTIVE
HGB BLD-MCNC: 11.1 G/DL (ref 13.5–17.5)
HGB RETIC QN: 39 PG (ref 28–38)
HIV 1+2 AB+HIV1 P24 AG SERPL QL IA: NONREACTIVE
IMM GRANULOCYTES # BLD AUTO: 0 X10*3/UL (ref 0–0.7)
IMM GRANULOCYTES NFR BLD AUTO: 0 % (ref 0–0.9)
IMMATURE RETIC FRACTION: 4.9 %
IRON SATN MFR SERPL: 22 % (ref 25–45)
IRON SERPL-MCNC: 82 UG/DL (ref 35–150)
LYMPHOCYTES # BLD AUTO: 0.78 X10*3/UL (ref 1.2–4.8)
LYMPHOCYTES NFR BLD AUTO: 21.3 %
MCH RBC QN AUTO: 32.5 PG (ref 26–34)
MCHC RBC AUTO-ENTMCNC: 33.9 G/DL (ref 32–36)
MCV RBC AUTO: 96 FL (ref 80–100)
MONOCYTES # BLD AUTO: 0.51 X10*3/UL (ref 0.1–1)
MONOCYTES NFR BLD AUTO: 13.9 %
NEUTROPHILS # BLD AUTO: 2.29 X10*3/UL (ref 1.2–7.7)
NEUTROPHILS NFR BLD AUTO: 62.4 %
NRBC BLD-RTO: ABNORMAL /100{WBCS}
PLATELET # BLD AUTO: 160 X10*3/UL (ref 150–450)
POTASSIUM SERPL-SCNC: 4.4 MMOL/L (ref 3.5–5.3)
PROT SERPL-MCNC: 7.9 G/DL (ref 6.4–8.2)
RBC # BLD AUTO: 3.42 X10*6/UL (ref 4.5–5.9)
RETICS #: 0.05 X10*6/UL (ref 0.02–0.12)
RETICS/RBC NFR AUTO: 1.4 % (ref 0.5–2)
SODIUM SERPL-SCNC: 126 MMOL/L (ref 136–145)
TIBC SERPL-MCNC: 366 UG/DL (ref 240–445)
TSH SERPL-ACNC: 1.89 MIU/L (ref 0.44–3.98)
UIBC SERPL-MCNC: 284 UG/DL (ref 110–370)
VIT B12 SERPL-MCNC: >2000 PG/ML (ref 211–911)
WBC # BLD AUTO: 3.7 X10*3/UL (ref 4.4–11.3)

## 2025-07-14 PROCEDURE — 82728 ASSAY OF FERRITIN: CPT | Performed by: NURSE PRACTITIONER

## 2025-07-14 PROCEDURE — 99215 OFFICE O/P EST HI 40 MIN: CPT | Performed by: NURSE PRACTITIONER

## 2025-07-14 PROCEDURE — 85045 AUTOMATED RETICULOCYTE COUNT: CPT | Performed by: NURSE PRACTITIONER

## 2025-07-14 PROCEDURE — 3008F BODY MASS INDEX DOCD: CPT | Performed by: NURSE PRACTITIONER

## 2025-07-14 PROCEDURE — 80053 COMPREHEN METABOLIC PANEL: CPT | Performed by: NURSE PRACTITIONER

## 2025-07-14 PROCEDURE — 1036F TOBACCO NON-USER: CPT | Performed by: NURSE PRACTITIONER

## 2025-07-14 PROCEDURE — 85025 COMPLETE CBC W/AUTO DIFF WBC: CPT | Performed by: NURSE PRACTITIONER

## 2025-07-14 PROCEDURE — 36415 COLL VENOUS BLD VENIPUNCTURE: CPT | Performed by: NURSE PRACTITIONER

## 2025-07-14 PROCEDURE — 3078F DIAST BP <80 MM HG: CPT | Performed by: NURSE PRACTITIONER

## 2025-07-14 PROCEDURE — 80074 ACUTE HEPATITIS PANEL: CPT | Mod: PORLAB | Performed by: NURSE PRACTITIONER

## 2025-07-14 PROCEDURE — 83550 IRON BINDING TEST: CPT | Performed by: NURSE PRACTITIONER

## 2025-07-14 PROCEDURE — 82607 VITAMIN B-12: CPT | Mod: PORLAB | Performed by: NURSE PRACTITIONER

## 2025-07-14 PROCEDURE — 84443 ASSAY THYROID STIM HORMONE: CPT | Performed by: NURSE PRACTITIONER

## 2025-07-14 PROCEDURE — 86663 EPSTEIN-BARR ANTIBODY: CPT | Mod: PORLAB | Performed by: NURSE PRACTITIONER

## 2025-07-14 PROCEDURE — 3074F SYST BP LT 130 MM HG: CPT | Performed by: NURSE PRACTITIONER

## 2025-07-14 PROCEDURE — 87389 HIV-1 AG W/HIV-1&-2 AB AG IA: CPT | Mod: PORLAB | Performed by: NURSE PRACTITIONER

## 2025-07-14 PROCEDURE — 82746 ASSAY OF FOLIC ACID SERUM: CPT | Mod: PORLAB | Performed by: NURSE PRACTITIONER

## 2025-07-14 ASSESSMENT — PAIN SCALES - GENERAL: PAINLEVEL_OUTOF10: 0-NO PAIN

## 2025-07-14 NOTE — PATIENT INSTRUCTIONS
Office follow up today with Ashley for decreased WBC level    Repeat lab work ordered for today  Ashley will call you tomorrow 7/15 with results at 0930 for a virtual appt    You will return in 3 months for an appt with Uzma Espinal  Lab work the same day  Call the office at [phone] with questions or needs.  You may also contact your nurse or doctor with non-urgent issues by sending a [email or web address] message.  Reminders  Medicine refills: call or send a [employer] message to request medicine refills at least 5 to 7 days before you run out.  Labs: You will need labs drawn at your follow-up doctor visit

## 2025-07-14 NOTE — PROGRESS NOTES
"Patient ID: Jeremi Monroy is a 35 y.o. male.  Referring Physician: ANTONIO Braswell  [address]  [facility], [address]  [place] [id number]  Primary Care Provider: ANTONIO Braswell  Visit Type: Initial Visit      [place]ive    [place]  [facility] [place] consult: 7/14/2025  Reason: Anemia/Leukopenia    Patient is a 36 yo man  with a complex PMH including HTN, HFpEF, Petic ulcer with GI bleed, hyponatremia, bipolar disorder with major depressive disorder, ETOH dependence in remission,   and was referred to benign hematology for consultation of anemia and leukopenia.    Review of his medical record notes a macrocytic anemia for the past year. With a low hgb of 6.5g/dL when he presented to the ED after passing out and hitting his head on a trash can. He was give 2 units PRBC's and EGD showed active bleeding peptic ulcer. He has been following with GI since that time and most recent EGD las week shows improvement. Patient states that he has been sober for 3 years. Prior to that he states he was close to death due to severe ETOH abuse. He states that he has been able to turn his life around and he is back to work as an . He recently was  and is very thankful that he has been given a \"second chance\" at a normal life.    Today, patient presents for initial consultation. Denies abnormal weight loss, night sweats, fever. Denies fatigue, chills, sob, cp, n/v/d, n/t. No PICA. Denies any abnormal bleeding or bruising. No recurrent infections or lymphadenopathy. No joint/body pain. No known blood disorders in family. Has had surgery in past w/o issue. Never had blood/blood products. Denies NSAID use.      Anemia history - chronic ETOH abuse  Colonoscopy - 6/27/25  EGD - 6/27/25  Stool - normal  Urine - no blood, but frequent urination  Diet - seeing a nutritionist  UTD Cancer screenings - yes    PMHx as above  Social Hx: , lives with wife, works from " "home full time as an , hx of ETOH abuse but sober for 3 years.   PSH: negative  FH:  Noted below  Meds: see list     Objective   BSA: 1.49 meters squared  BP 88/62   Pulse 58   Temp 36.1 °C (97 °F)   Resp 16   Ht (S) 1.793 m (5' 10.59\")   Wt (!) 44.3 kg (97 lb 12.4 oz)   SpO2 100%   BMI 13.80 kg/m²      has a past medical history of (HFpEF) heart failure with preserved ejection fraction, Anxiety and depression, History of ETOH abuse, HTN (hypertension), and Seizure disorder (Multi).   has no past surgical history on file.  Family History[1]      Jeremi Monroy  reports that he has never smoked. He has never used smokeless tobacco.  He  reports that he does not currently use alcohol.  He  reports no history of drug use.    Physical Exam  Constitutional:       Comments: Extremely thin, cachectic   HENT:      Head: Normocephalic.      Nose: Nose normal.      Mouth/Throat:      Mouth: Mucous membranes are moist.   Eyes:      Pupils: Pupils are equal, round, and reactive to light.   Cardiovascular:      Rate and Rhythm: Normal rate and regular rhythm.      Pulses: Normal pulses.      Heart sounds: Normal heart sounds.   Pulmonary:      Effort: Pulmonary effort is normal.      Breath sounds: Normal breath sounds.   Abdominal:      General: Bowel sounds are normal.      Palpations: Abdomen is soft.   Musculoskeletal:         General: Normal range of motion.   Skin:     General: Skin is warm and dry.   Neurological:      General: No focal deficit present.      Mental Status: He is alert and oriented to person, place, and time.   Psychiatric:         Mood and Affect: Mood normal.         Behavior: Behavior normal.         WBC   Date/Time Value Ref Range Status   07/14/2025 09:43 AM 3.7 (L) 4.4 - 11.3 x10*3/uL Final   01/08/2025 08:16 AM 5.5 4.4 - 11.3 x10*3/uL Final   01/03/2025 07:31 AM 5.8 4.4 - 11.3 x10*3/uL Final     WHITE BLOOD CELL COUNT   Date/Time Value Ref Range Status   04/15/2025 08:26 AM 4.3 3.8 - " 10.8 Thousand/uL Final     nRBC   Date Value Ref Range Status   07/14/2025   Final     Comment:     Not Measured   01/08/2025 0.0 0.0 - 0.0 /100 WBCs Final   01/03/2025 0.0 0.0 - 0.0 /100 WBCs Final     RBC   Date Value Ref Range Status   07/14/2025 3.42 (L) 4.50 - 5.90 x10*6/uL Final   01/08/2025 2.24 (L) 4.50 - 5.90 x10*6/uL Final   01/03/2025 1.92 (L) 4.50 - 5.90 x10*6/uL Final     RED BLOOD CELL COUNT   Date Value Ref Range Status   04/15/2025 3.30 (L) 4.20 - 5.80 Million/uL Final     Hemoglobin   Date Value Ref Range Status   07/14/2025 11.1 (L) 13.5 - 17.5 g/dL Final   01/08/2025 7.4 (L) 13.5 - 17.5 g/dL Final   01/03/2025 6.6 (L) 13.5 - 17.5 g/dL Final     HEMOGLOBIN   Date Value Ref Range Status   04/15/2025 10.7 (L) 13.2 - 17.1 g/dL Final     Hematocrit   Date Value Ref Range Status   07/14/2025 32.7 (L) 41.0 - 52.0 % Final   01/08/2025 22.9 (L) 41.0 - 52.0 % Final   01/03/2025 19.6 (L) 41.0 - 52.0 % Final     HEMATOCRIT   Date Value Ref Range Status   04/15/2025 31.8 (L) 38.5 - 50.0 % Final     MCV   Date/Time Value Ref Range Status   07/14/2025 09:43 AM 96 80 - 100 fL Final   04/15/2025 08:26 AM 96.4 80.0 - 100.0 fL Final   01/08/2025 08:16  (H) 80 - 100 fL Final   01/03/2025 07:31  (H) 80 - 100 fL Final     MCH   Date/Time Value Ref Range Status   07/14/2025 09:43 AM 32.5 26.0 - 34.0 pg Final   04/15/2025 08:26 AM 32.4 27.0 - 33.0 pg Final   01/08/2025 08:16 AM 33.0 26.0 - 34.0 pg Final   01/03/2025 07:31 AM 34.9 (H) 26.0 - 34.0 pg Final     MCHC   Date/Time Value Ref Range Status   07/14/2025 09:43 AM 33.9 32.0 - 36.0 g/dL Final   04/15/2025 08:26 AM 33.6 32.0 - 36.0 g/dL Final     Comment:     For adults, a slight decrease in the calculated MCHC  value (in the range of 30 to 32 g/dL) is most likely  not clinically significant; however, it should be  interpreted with caution in correlation with other  red cell parameters and the patient's clinical  condition.     01/08/2025 08:16 AM 32.3  32.0 - 36.0 g/dL Final   01/03/2025 07:31 AM 34.2 32.0 - 36.0 g/dL Final     RDW   Date/Time Value Ref Range Status   07/14/2025 09:43 AM 13.6 11.5 - 14.5 % Final   04/15/2025 08:26 AM 11.9 11.0 - 15.0 % Final   01/08/2025 08:16 AM 15.1 (H) 11.5 - 14.5 % Final   01/03/2025 07:31 AM 13.2 11.5 - 14.5 % Final     Platelets   Date/Time Value Ref Range Status   07/14/2025 09:43  150 - 450 x10*3/uL Final   01/08/2025 08:16  150 - 450 x10*3/uL Final   01/03/2025 07:31  150 - 450 x10*3/uL Final     PLATELET COUNT   Date/Time Value Ref Range Status   04/15/2025 08:26  140 - 400 Thousand/uL Final     MPV   Date/Time Value Ref Range Status   04/15/2025 08:26 AM 10.0 7.5 - 12.5 fL Final     Neutrophils %   Date/Time Value Ref Range Status   07/14/2025 09:43 AM 62.4 40.0 - 80.0 % Final   01/03/2025 07:31 AM 80.9 40.0 - 80.0 % Final   07/29/2024 07:20 AM 61.4 40.0 - 80.0 % Final     Immature Granulocytes %, Automated   Date/Time Value Ref Range Status   07/14/2025 09:43 AM 0.0 0.0 - 0.9 % Final     Comment:     Immature Granulocyte Count (IG) includes promyelocytes, myelocytes and metamyelocytes but does not include bands. Percent differential counts (%) should be interpreted in the context of the absolute cell counts (cells/UL).   01/03/2025 07:31 AM 0.5 0.0 - 0.9 % Final     Comment:     Immature Granulocyte Count (IG) includes promyelocytes, myelocytes and metamyelocytes but does not include bands. Percent differential counts (%) should be interpreted in the context of the absolute cell counts (cells/UL).   07/29/2024 07:20 AM 0.4 0.0 - 0.9 % Final     Comment:     Immature Granulocyte Count (IG) includes promyelocytes, myelocytes and metamyelocytes but does not include bands. Percent differential counts (%) should be interpreted in the context of the absolute cell counts (cells/UL).     Lymphocytes %   Date/Time Value Ref Range Status   07/14/2025 09:43 AM 21.3 13.0 - 44.0 % Final   01/03/2025 07:31 AM  8.8 13.0 - 44.0 % Final   07/29/2024 07:20 AM 20.0 13.0 - 44.0 % Final     LYMPHOCYTES   Date/Time Value Ref Range Status   04/15/2025 08:26 AM 24.7 % Final     Monocytes %   Date/Time Value Ref Range Status   07/14/2025 09:43 AM 13.9 2.0 - 10.0 % Final   01/03/2025 07:31 AM 9.2 2.0 - 10.0 % Final   07/29/2024 07:20 AM 15.7 2.0 - 10.0 % Final     MONOCYTES   Date/Time Value Ref Range Status   04/15/2025 08:26 AM 12.0 % Final     Eosinophils %   Date/Time Value Ref Range Status   07/14/2025 09:43 AM 1.9 0.0 - 6.0 % Final   01/03/2025 07:31 AM 0.3 0.0 - 6.0 % Final   07/29/2024 07:20 AM 2.1 0.0 - 6.0 % Final     EOSINOPHILS   Date/Time Value Ref Range Status   04/15/2025 08:26 AM 1.4 % Final     Basophils %   Date/Time Value Ref Range Status   07/14/2025 09:43 AM 0.5 0.0 - 2.0 % Final   01/03/2025 07:31 AM 0.3 0.0 - 2.0 % Final   07/29/2024 07:20 AM 0.4 0.0 - 2.0 % Final     BASOPHILS   Date/Time Value Ref Range Status   04/15/2025 08:26 AM 0.5 % Final     Neutrophils Absolute   Date/Time Value Ref Range Status   07/14/2025 09:43 AM 2.29 1.20 - 7.70 x10*3/uL Final     Comment:     Percent differential counts (%) should be interpreted in the context of the absolute cell counts (cells/uL).   01/03/2025 07:31 AM 4.67 1.20 - 7.70 x10*3/uL Final     Comment:     Percent differential counts (%) should be interpreted in the context of the absolute cell counts (cells/uL).   07/29/2024 07:20 AM 1.72 1.20 - 7.70 x10*3/uL Final     Comment:     Percent differential counts (%) should be interpreted in the context of the absolute cell counts (cells/uL).     Immature Granulocytes Absolute, Automated   Date/Time Value Ref Range Status   07/14/2025 09:43 AM 0.00 0.00 - 0.70 x10*3/uL Final   01/03/2025 07:31 AM 0.03 0.00 - 0.70 x10*3/uL Final   07/29/2024 07:20 AM 0.01 0.00 - 0.70 x10*3/uL Final     Lymphocytes Absolute   Date/Time Value Ref Range Status   07/14/2025 09:43 AM 0.78 (L) 1.20 - 4.80 x10*3/uL Final   01/03/2025 07:31 AM  0.51 (L) 1.20 - 4.80 x10*3/uL Final   07/29/2024 07:20 AM 0.56 (L) 1.20 - 4.80 x10*3/uL Final     Monocytes Absolute   Date/Time Value Ref Range Status   07/14/2025 09:43 AM 0.51 0.10 - 1.00 x10*3/uL Final   01/03/2025 07:31 AM 0.53 0.10 - 1.00 x10*3/uL Final   07/29/2024 07:20 AM 0.44 0.10 - 1.00 x10*3/uL Final     Eosinophils Absolute   Date/Time Value Ref Range Status   07/14/2025 09:43 AM 0.07 0.00 - 0.70 x10*3/uL Final   01/03/2025 07:31 AM 0.02 0.00 - 0.70 x10*3/uL Final   07/29/2024 07:20 AM 0.06 0.00 - 0.70 x10*3/uL Final     ABSOLUTE EOSINOPHILS   Date/Time Value Ref Range Status   04/15/2025 08:26 AM 60 15 - 500 cells/uL Final     Basophils Absolute   Date/Time Value Ref Range Status   07/14/2025 09:43 AM 0.02 0.00 - 0.10 x10*3/uL Final   01/03/2025 07:31 AM 0.02 0.00 - 0.10 x10*3/uL Final     Comment:     Automated WBC differential has been confirmed by manual smear.   07/29/2024 07:20 AM 0.01 0.00 - 0.10 x10*3/uL Final     ABSOLUTE BASOPHILS   Date/Time Value Ref Range Status   04/15/2025 08:26 AM 22 0 - 200 cells/uL Final      Latest Reference Range & Units 07/14/25 09:43   GLUCOSE 74 - 99 mg/dL 86   SODIUM 136 - 145 mmol/L 126 (L)   POTASSIUM 3.5 - 5.3 mmol/L 4.4   CHLORIDE 98 - 107 mmol/L 94 (L)   Bicarbonate 21 - 32 mmol/L 29   Anion Gap 10 - 20 mmol/L 7 (L)   Blood Urea Nitrogen 6 - 23 mg/dL 15   Creatinine 0.50 - 1.30 mg/dL 0.54   EGFR >60 mL/min/1.73m*2 >90   Calcium 8.6 - 10.3 mg/dL 9.3   Albumin 3.4 - 5.0 g/dL 4.7   Alkaline Phosphatase 33 - 120 U/L 140 (H)   ALT 10 - 52 U/L 24   AST 9 - 39 U/L 18   Bilirubin Total 0.0 - 1.2 mg/dL 0.5   FERRITIN 20 - 300 ng/mL 122   Total Protein 6.4 - 8.2 g/dL 7.9   IRON 35 - 150 ug/dL 82   TIBC 240 - 445 ug/dL 366   % Saturation 25 - 45 % 22 (L)   UIBC 110 - 370 ug/dL 284     Assessment/Plan    36 yo man  with a complex PMH including HTN, HFpEF, Petic ulcer with GI bleed, hyponatremia, bipolar disorder with major depressive disorder, ETOH dependence in  "remission,   and was referred to benign hematology for consultation of anemia and leukopenia.    Review of his medical record notes a macrocytic anemia for the past year. With a low hgb of 6.5g/dL when he presented to the ED after passing out and hitting his head on a trash can. He was give 2 units PRBC's and EGD showed active bleeding peptic ulcer. He has been following with GI since that time and most recent EGD las week shows improvement. Patient states that he has been sober for 3 years. Prior to that he states he was close to death due to severe ETOH abuse. He states that he has been able to turn his life around and he is back to work as an . He recently was  and is very thankful that he has been given a \"second chance\" at a normal life.    Extremely thin but states that he has been working with a nutritionist to eat healthy and gain weight.    Discussed possible etiologies including multifactorial, nutritional deficiencies, anemia of chronic disease, malabsorption, hemopathy, medications, bleeding, malignancy, etc. Discussed possible etiologies including chronic benign leukopenia, medications, secondary to viral infection and auto-immune issues. We stressed that this does not appear to be a heme malignancy.  Will start hematological workup today.     PLAN:  1. Labs today: CBC/diff, CMP, retics, iron studies, B12, folate, TSH, hepatitis panel, EBV panel, HIV screen. So far I noted significant increase in hemoglobin.  2. I will contact patient tomorrow to review results and discuss further recommendations.    I had an extensive discussion with the patient regarding the diagnosis and discussed the plan of therapy, including general considerations regarding side effects and outcomes. Pt understood and gave appropriate teach back about the plan of care. All questions were answered to the patient's satisfaction. The patient is instructed to contact us at any time if questions or problems " arise. Thank you for the opportunity to participate in the care of this very pleasant patient.         Problem List Items Addressed This Visit    None  Visit Diagnoses         Codes      Other iron deficiency anemia    -  Primary D50.8    Relevant Orders    CBC and Auto Differential (Completed)    Comprehensive Metabolic Panel (Completed)    Ferritin (Completed)    Folate    Iron and TIBC (Completed)    Vitamin B12    Reticulocytes (Completed)    Clinic Appointment Request [facility]    HIV 1/2 Antigen/Antibody Screen with Reflex to Confirmation    Hepatitis Panel, Acute    Stanislaw-Barr Virus Antibody Panel    TSH with reflex to Free T4 if abnormal      Other decreased white blood cell (WBC) count     D72.818    Relevant Orders    CBC and Auto Differential (Completed)    Comprehensive Metabolic Panel (Completed)    Ferritin (Completed)    Folate    Iron and TIBC (Completed)    Vitamin B12    Reticulocytes (Completed)    Clinic Appointment Request [facility]    HIV 1/2 Antigen/Antibody Screen with Reflex to Confirmation    Hepatitis Panel, Acute    Stanislaw-Barr Virus Antibody Panel      Chronic fatigue, unspecified     R53.82    Relevant Orders    Hepatitis Panel, Acute      Fatigue associated with anemia     D64.9    Relevant Orders    TSH with reflex to Free T4 if abnormal                 Rosanne M Casal, APRN-CNP, DNP                                [1]   Family History  Problem Relation Name Age of Onset    No Known Problems Mother      Hypertension Father Arsh Monroy     Colon cancer Father Arsh Morillas     Cancer Father Arsh Monroy     Alcohol abuse Maternal Grandfather Joshua

## 2025-07-15 ENCOUNTER — TELEMEDICINE (OUTPATIENT)
Dept: HEMATOLOGY/ONCOLOGY | Facility: HOSPITAL | Age: 35
End: 2025-07-15
Payer: MEDICARE

## 2025-07-15 DIAGNOSIS — D50.8 OTHER IRON DEFICIENCY ANEMIA: Primary | ICD-10-CM

## 2025-07-15 PROBLEM — D50.0 IRON DEFICIENCY ANEMIA DUE TO CHRONIC BLOOD LOSS: Status: ACTIVE | Noted: 2025-07-15

## 2025-07-15 LAB
EBV EA IGG SER QL: NEGATIVE
EBV NA AB SER QL: POSITIVE
EBV VCA IGG SER IA-ACNC: POSITIVE
EBV VCA IGM SER IA-ACNC: POSITIVE

## 2025-07-15 PROCEDURE — 1036F TOBACCO NON-USER: CPT | Performed by: NURSE PRACTITIONER

## 2025-07-15 PROCEDURE — 99212 OFFICE O/P EST SF 10 MIN: CPT | Performed by: NURSE PRACTITIONER

## 2025-07-15 RX ORDER — FERROUS SULFATE 325(65) MG
325 TABLET ORAL
Qty: 30 TABLET | Refills: 11 | Status: SHIPPED | OUTPATIENT
Start: 2025-07-15 | End: 2026-07-15

## 2025-07-15 NOTE — PROGRESS NOTES
"Patient ID: Jeremi Monroy is a 35 y.o. male.  Referring Physician: No referring provider defined for this encounter.  Primary Care Provider: ANTONIO Braswell  Visit Type:  Follow Up     Verbal consent was requested and obtained from patient on this date for a telehealth visit.    Subjective    Location:  [facility] [place]  Initial consult: 7/14/2025  Reason: Anemia/Leukopenia     Patient is a 36 yo man  with a complex PMH including HTN, HFpEF, Petic ulcer with GI bleed, hyponatremia, bipolar disorder with major depressive disorder, ETOH dependence in remission,   and was referred to benign hematology for consultation of anemia and leukopenia.     Review of his medical record notes a macrocytic anemia for the past year. With a low hgb of 6.5g/dL when he presented to the ED after passing out and hitting his head on a trash can. He was give 2 units PRBC's and EGD showed active bleeding peptic ulcer. He has been following with GI since that time and most recent EGD las week shows improvement. Patient states that he has been sober for 3 years. Prior to that he states he was close to death due to severe ETOH abuse. He states that he has been able to turn his life around and he is back to work as an . He recently was  and is very thankful that he has been given a \"second chance\" at a normal life.     Today, patient presents for telephone follow up to review labs from yesterday. Denies abnormal weight loss, night sweats, fever. Denies fatigue, chills, sob, cp, n/v/d, n/t. No PICA. Denies any abnormal bleeding or bruising. No recurrent infections or lymphadenopathy. No joint/body pain. No known blood disorders in family. Has had surgery in past w/o issue. Never had blood/blood products. Denies NSAID use.       Anemia history - chronic ETOH abuse  Colonoscopy - 6/27/25  EGD - 6/27/25  Stool - normal  Urine - no blood, but frequent urination  Diet - seeing a nutritionist  UTD Cancer " "screenings - yes     PMHx as above  Social Hx: , lives with wife, works from home full time as an , hx of ETOH abuse but sober for 3 years.   PSH: negative  FH:  Noted below  Meds: see list      Objective  BSA: 1.49 meters squared  BP 88/62   Pulse 58   Temp 36.1 °C (97 °F)   Resp 16   Ht (S) 1.793 m (5' 10.59\")   Wt (!) 44.3 kg (97 lb 12.4 oz)   SpO2 100%   BMI 13.80 kg/m²       has a past medical history of (HFpEF) heart failure with preserved ejection fraction, Anxiety and depression, History of ETOH abuse, HTN (hypertension), and Seizure disorder (Multi).   has no past surgical history on file.  [Family History]    [Family History]         Problem Relation Name Age of Onset    No Known Problems Mother        Hypertension Father Arsh Monroy      Colon cancer Father Arsh Monroy      Cancer Father Arsh Monroy      Alcohol abuse Maternal Grandfather Joshua Monroy  reports that he has never smoked. He has never used smokeless tobacco.  He  reports that he does not currently use alcohol.  He  reports no history of drug use.       Objective   BSA: There is no height or weight on file to calculate BSA.  There were no vitals taken for this visit.     has a past medical history of (HFpEF) heart failure with preserved ejection fraction, Anxiety and depression, History of ETOH abuse, HTN (hypertension), and Seizure disorder (Multi).   has no past surgical history on file.  Family History[1]    Jeremi Monroy  reports that he has never smoked. He has never used smokeless tobacco.  He  reports that he does not currently use alcohol.  He  reports no history of drug use.    WBC   Date/Time Value Ref Range Status   07/14/2025 09:43 AM 3.7 (L) 4.4 - 11.3 x10*3/uL Final   01/08/2025 08:16 AM 5.5 4.4 - 11.3 x10*3/uL Final   01/03/2025 07:31 AM 5.8 4.4 - 11.3 x10*3/uL Final     WHITE BLOOD CELL COUNT   Date/Time Value Ref Range Status   04/15/2025 08:26 AM 4.3 3.8 - 10.8 " Thousand/uL Final     nRBC   Date Value Ref Range Status   07/14/2025   Final     Comment:     Not Measured   01/08/2025 0.0 0.0 - 0.0 /100 WBCs Final   01/03/2025 0.0 0.0 - 0.0 /100 WBCs Final     RBC   Date Value Ref Range Status   07/14/2025 3.42 (L) 4.50 - 5.90 x10*6/uL Final   01/08/2025 2.24 (L) 4.50 - 5.90 x10*6/uL Final   01/03/2025 1.92 (L) 4.50 - 5.90 x10*6/uL Final     RED BLOOD CELL COUNT   Date Value Ref Range Status   04/15/2025 3.30 (L) 4.20 - 5.80 Million/uL Final     Hemoglobin   Date Value Ref Range Status   07/14/2025 11.1 (L) 13.5 - 17.5 g/dL Final   01/08/2025 7.4 (L) 13.5 - 17.5 g/dL Final   01/03/2025 6.6 (L) 13.5 - 17.5 g/dL Final     HEMOGLOBIN   Date Value Ref Range Status   04/15/2025 10.7 (L) 13.2 - 17.1 g/dL Final     Hematocrit   Date Value Ref Range Status   07/14/2025 32.7 (L) 41.0 - 52.0 % Final   01/08/2025 22.9 (L) 41.0 - 52.0 % Final   01/03/2025 19.6 (L) 41.0 - 52.0 % Final     HEMATOCRIT   Date Value Ref Range Status   04/15/2025 31.8 (L) 38.5 - 50.0 % Final     MCV   Date/Time Value Ref Range Status   07/14/2025 09:43 AM 96 80 - 100 fL Final   04/15/2025 08:26 AM 96.4 80.0 - 100.0 fL Final   01/08/2025 08:16  (H) 80 - 100 fL Final   01/03/2025 07:31  (H) 80 - 100 fL Final     MCH   Date/Time Value Ref Range Status   07/14/2025 09:43 AM 32.5 26.0 - 34.0 pg Final   04/15/2025 08:26 AM 32.4 27.0 - 33.0 pg Final   01/08/2025 08:16 AM 33.0 26.0 - 34.0 pg Final   01/03/2025 07:31 AM 34.9 (H) 26.0 - 34.0 pg Final     MCHC   Date/Time Value Ref Range Status   07/14/2025 09:43 AM 33.9 32.0 - 36.0 g/dL Final   04/15/2025 08:26 AM 33.6 32.0 - 36.0 g/dL Final     Comment:     For adults, a slight decrease in the calculated MCHC  value (in the range of 30 to 32 g/dL) is most likely  not clinically significant; however, it should be  interpreted with caution in correlation with other  red cell parameters and the patient's clinical  condition.     01/08/2025 08:16 AM 32.3 32.0 -  36.0 g/dL Final   01/03/2025 07:31 AM 34.2 32.0 - 36.0 g/dL Final     RDW   Date/Time Value Ref Range Status   07/14/2025 09:43 AM 13.6 11.5 - 14.5 % Final   04/15/2025 08:26 AM 11.9 11.0 - 15.0 % Final   01/08/2025 08:16 AM 15.1 (H) 11.5 - 14.5 % Final   01/03/2025 07:31 AM 13.2 11.5 - 14.5 % Final     Platelets   Date/Time Value Ref Range Status   07/14/2025 09:43  150 - 450 x10*3/uL Final   01/08/2025 08:16  150 - 450 x10*3/uL Final   01/03/2025 07:31  150 - 450 x10*3/uL Final     PLATELET COUNT   Date/Time Value Ref Range Status   04/15/2025 08:26  140 - 400 Thousand/uL Final     MPV   Date/Time Value Ref Range Status   04/15/2025 08:26 AM 10.0 7.5 - 12.5 fL Final     Neutrophils %   Date/Time Value Ref Range Status   07/14/2025 09:43 AM 62.4 40.0 - 80.0 % Final   01/03/2025 07:31 AM 80.9 40.0 - 80.0 % Final   07/29/2024 07:20 AM 61.4 40.0 - 80.0 % Final     Immature Granulocytes %, Automated   Date/Time Value Ref Range Status   07/14/2025 09:43 AM 0.0 0.0 - 0.9 % Final     Comment:     Immature Granulocyte Count (IG) includes promyelocytes, myelocytes and metamyelocytes but does not include bands. Percent differential counts (%) should be interpreted in the context of the absolute cell counts (cells/UL).   01/03/2025 07:31 AM 0.5 0.0 - 0.9 % Final     Comment:     Immature Granulocyte Count (IG) includes promyelocytes, myelocytes and metamyelocytes but does not include bands. Percent differential counts (%) should be interpreted in the context of the absolute cell counts (cells/UL).   07/29/2024 07:20 AM 0.4 0.0 - 0.9 % Final     Comment:     Immature Granulocyte Count (IG) includes promyelocytes, myelocytes and metamyelocytes but does not include bands. Percent differential counts (%) should be interpreted in the context of the absolute cell counts (cells/UL).     Lymphocytes %   Date/Time Value Ref Range Status   07/14/2025 09:43 AM 21.3 13.0 - 44.0 % Final   01/03/2025 07:31 AM 8.8  13.0 - 44.0 % Final   07/29/2024 07:20 AM 20.0 13.0 - 44.0 % Final     LYMPHOCYTES   Date/Time Value Ref Range Status   04/15/2025 08:26 AM 24.7 % Final     Monocytes %   Date/Time Value Ref Range Status   07/14/2025 09:43 AM 13.9 2.0 - 10.0 % Final   01/03/2025 07:31 AM 9.2 2.0 - 10.0 % Final   07/29/2024 07:20 AM 15.7 2.0 - 10.0 % Final     MONOCYTES   Date/Time Value Ref Range Status   04/15/2025 08:26 AM 12.0 % Final     Eosinophils %   Date/Time Value Ref Range Status   07/14/2025 09:43 AM 1.9 0.0 - 6.0 % Final   01/03/2025 07:31 AM 0.3 0.0 - 6.0 % Final   07/29/2024 07:20 AM 2.1 0.0 - 6.0 % Final     EOSINOPHILS   Date/Time Value Ref Range Status   04/15/2025 08:26 AM 1.4 % Final     Basophils %   Date/Time Value Ref Range Status   07/14/2025 09:43 AM 0.5 0.0 - 2.0 % Final   01/03/2025 07:31 AM 0.3 0.0 - 2.0 % Final   07/29/2024 07:20 AM 0.4 0.0 - 2.0 % Final     BASOPHILS   Date/Time Value Ref Range Status   04/15/2025 08:26 AM 0.5 % Final     Neutrophils Absolute   Date/Time Value Ref Range Status   07/14/2025 09:43 AM 2.29 1.20 - 7.70 x10*3/uL Final     Comment:     Percent differential counts (%) should be interpreted in the context of the absolute cell counts (cells/uL).   01/03/2025 07:31 AM 4.67 1.20 - 7.70 x10*3/uL Final     Comment:     Percent differential counts (%) should be interpreted in the context of the absolute cell counts (cells/uL).   07/29/2024 07:20 AM 1.72 1.20 - 7.70 x10*3/uL Final     Comment:     Percent differential counts (%) should be interpreted in the context of the absolute cell counts (cells/uL).     Immature Granulocytes Absolute, Automated   Date/Time Value Ref Range Status   07/14/2025 09:43 AM 0.00 0.00 - 0.70 x10*3/uL Final   01/03/2025 07:31 AM 0.03 0.00 - 0.70 x10*3/uL Final   07/29/2024 07:20 AM 0.01 0.00 - 0.70 x10*3/uL Final     Lymphocytes Absolute   Date/Time Value Ref Range Status   07/14/2025 09:43 AM 0.78 (L) 1.20 - 4.80 x10*3/uL Final   01/03/2025 07:31 AM 0.51  (L) 1.20 - 4.80 x10*3/uL Final   07/29/2024 07:20 AM 0.56 (L) 1.20 - 4.80 x10*3/uL Final     Monocytes Absolute   Date/Time Value Ref Range Status   07/14/2025 09:43 AM 0.51 0.10 - 1.00 x10*3/uL Final   01/03/2025 07:31 AM 0.53 0.10 - 1.00 x10*3/uL Final   07/29/2024 07:20 AM 0.44 0.10 - 1.00 x10*3/uL Final     Eosinophils Absolute   Date/Time Value Ref Range Status   07/14/2025 09:43 AM 0.07 0.00 - 0.70 x10*3/uL Final   01/03/2025 07:31 AM 0.02 0.00 - 0.70 x10*3/uL Final   07/29/2024 07:20 AM 0.06 0.00 - 0.70 x10*3/uL Final     ABSOLUTE EOSINOPHILS   Date/Time Value Ref Range Status   04/15/2025 08:26 AM 60 15 - 500 cells/uL Final     Basophils Absolute   Date/Time Value Ref Range Status   07/14/2025 09:43 AM 0.02 0.00 - 0.10 x10*3/uL Final   01/03/2025 07:31 AM 0.02 0.00 - 0.10 x10*3/uL Final     Comment:     Automated WBC differential has been confirmed by manual smear.   07/29/2024 07:20 AM 0.01 0.00 - 0.10 x10*3/uL Final     ABSOLUTE BASOPHILS   Date/Time Value Ref Range Status   04/15/2025 08:26 AM 22 0 - 200 cells/uL Final      Latest Reference Range & Units 07/14/25 09:43   FERRITIN 20 - 300 ng/mL 122   FOLATE >5.0 ng/mL >24.0   Total Protein 6.4 - 8.2 g/dL 7.9   IRON 35 - 150 ug/dL 82   TIBC 240 - 445 ug/dL 366   % Saturation 25 - 45 % 22 (L)   UIBC 110 - 370 ug/dL 284   Vitamin B12 211 - 911 pg/mL >2,000 (H)   Thyroid Stimulating Hormone 0.44 - 3.98 mIU/L 1.89     Assessment/Plan    34 yo man  with a complex PMH including HTN, HFpEF, Petic ulcer with GI bleed, hyponatremia, bipolar disorder with major depressive disorder, ETOH dependence in remission,and was referred to benign hematology for consultation of anemia and leukopenia.   Extremely thin but states that he has been working with a nutritionist to eat healthy and gain weight.     Discussed possible etiologies including multifactorial, nutritional deficiencies, anemia of chronic disease, malabsorption, hemopathy, medications, bleeding, malignancy,  etc. Discussed possible etiologies including chronic benign leukopenia, medications, secondary to viral infection and auto-immune issues. We stressed that this does not appear to be a heme malignancy.      Today we reviewed our labwork and noted that his anemia is improving, mild LFT elevation (patient does have an appt with hepatology, mild iron deficiency, B12 over 2000 (pt took MVI prior to visit), all viral panels negative with the exception of EBV which indicates he had mono or EBV infection in the past, that could be responsible for his lymphopenia. Patient states that GI started oral iron Ferrous Sulfate 325mg po daily a few weeks ago. He is tolerating well but needs a refill soon.      PLAN:  #NIMISHA  Ferrous Sulfate one tab every day (325mg PO tab). To take with OJ or Vit C tab 500mg to help increase absorption. RX sent to pharmacy. If insurance does not cover, to get OTC.   - Given printout on iron rich food provided by [employer]. Can also cook food in iron skillet  - Discussed ways to manage constipation SE  - The potential side effects of iron pills may include nausea, constipation, abdominal discomfort, green or black stool, and rarely diarrhea.   - Take at least 2hrs before PPI (ie Nexium)/ calcium products (ie Tums) or 4hrs after.  - Take with food if causes GI upset or nausea  - For constipation, good hydration, fruits (like prunes, apricots, figs), veggies. Can try flax seeds. Can try Miralax and/or Colace.  #Transient Leukopenia (lymphopenia) - will watch, discussed that if persisent we will complete a workup in the future.   1 . Follow up - CBC/diff, CMP, retics, iron studies, B12, folate with exam in 3 months. Patient will see Uzma Espinal NP at [place].       I had an extensive discussion with the patient regarding the diagnosis and discussed the plan of therapy, including general considerations regarding side effects and outcomes. Pt understood and gave appropriate teach back about the plan of care.  All questions were answered to the patient's satisfaction. The patient is instructed to contact us at any time if questions or problems arise. Thank you for the opportunity to participate in the care of this very pleasant patient.                   Rosanne M Casal, APRN-CNP, DNP                                [1]   Family History  Problem Relation Name Age of Onset    No Known Problems Mother      Hypertension Father Arsh Monroy     Colon cancer Father Arsh Monroy     Cancer Father Arsh Justinwandaas     Alcohol abuse Maternal Grandfather Joshua

## 2025-07-15 NOTE — PROGRESS NOTES
Phone visit on 7/15/2025 with Rose Casal NP.   Mailed AVS and lab results from 7/14/2025 will f/up with  Uzma Ricks 10/14/2025.Mague King RN

## 2025-07-29 ENCOUNTER — APPOINTMENT (OUTPATIENT)
Dept: PRIMARY CARE | Facility: CLINIC | Age: 35
End: 2025-07-29
Payer: COMMERCIAL

## 2025-07-29 VITALS
TEMPERATURE: 96.4 F | DIASTOLIC BLOOD PRESSURE: 58 MMHG | RESPIRATION RATE: 16 BRPM | WEIGHT: 98 LBS | HEART RATE: 58 BPM | HEIGHT: 71 IN | BODY MASS INDEX: 13.72 KG/M2 | SYSTOLIC BLOOD PRESSURE: 91 MMHG | OXYGEN SATURATION: 97 %

## 2025-07-29 DIAGNOSIS — J30.9 ALLERGIC RHINITIS, UNSPECIFIED SEASONALITY, UNSPECIFIED TRIGGER: Primary | ICD-10-CM

## 2025-07-29 DIAGNOSIS — F31.81 BIPOLAR II DISORDER (MULTI): ICD-10-CM

## 2025-07-29 DIAGNOSIS — E87.1 HYPONATREMIA: ICD-10-CM

## 2025-07-29 DIAGNOSIS — I10 HYPERTENSION, UNSPECIFIED TYPE: ICD-10-CM

## 2025-07-29 PROCEDURE — 3074F SYST BP LT 130 MM HG: CPT | Performed by: STUDENT IN AN ORGANIZED HEALTH CARE EDUCATION/TRAINING PROGRAM

## 2025-07-29 PROCEDURE — 3078F DIAST BP <80 MM HG: CPT | Performed by: STUDENT IN AN ORGANIZED HEALTH CARE EDUCATION/TRAINING PROGRAM

## 2025-07-29 PROCEDURE — 3008F BODY MASS INDEX DOCD: CPT | Performed by: STUDENT IN AN ORGANIZED HEALTH CARE EDUCATION/TRAINING PROGRAM

## 2025-07-29 PROCEDURE — 99213 OFFICE O/P EST LOW 20 MIN: CPT | Performed by: STUDENT IN AN ORGANIZED HEALTH CARE EDUCATION/TRAINING PROGRAM

## 2025-07-29 RX ORDER — FLUTICASONE PROPIONATE 50 MCG
1 SPRAY, SUSPENSION (ML) NASAL DAILY
Qty: 16 G | Refills: 1 | Status: SHIPPED | OUTPATIENT
Start: 2025-07-29 | End: 2026-07-29

## 2025-07-29 RX ORDER — LORATADINE 10 MG/1
10 TABLET ORAL NIGHTLY PRN
Qty: 30 TABLET | Refills: 1 | Status: SHIPPED | OUTPATIENT
Start: 2025-07-29 | End: 2025-10-27

## 2025-07-29 RX ORDER — LISINOPRIL 2.5 MG/1
2.5 TABLET ORAL
Qty: 30 TABLET | Refills: 3 | Status: SHIPPED | OUTPATIENT
Start: 2025-07-29

## 2025-07-29 SDOH — ECONOMIC STABILITY: FOOD INSECURITY: WITHIN THE PAST 12 MONTHS, THE FOOD YOU BOUGHT JUST DIDN'T LAST AND YOU DIDN'T HAVE MONEY TO GET MORE.: NEVER TRUE

## 2025-07-29 SDOH — ECONOMIC STABILITY: FOOD INSECURITY: WITHIN THE PAST 12 MONTHS, YOU WORRIED THAT YOUR FOOD WOULD RUN OUT BEFORE YOU GOT MONEY TO BUY MORE.: NEVER TRUE

## 2025-07-29 ASSESSMENT — PATIENT HEALTH QUESTIONNAIRE - PHQ9
1. LITTLE INTEREST OR PLEASURE IN DOING THINGS: NOT AT ALL
SUM OF ALL RESPONSES TO PHQ9 QUESTIONS 1 & 2: 0
2. FEELING DOWN, DEPRESSED OR HOPELESS: NOT AT ALL

## 2025-07-29 ASSESSMENT — LIFESTYLE VARIABLES
HOW OFTEN DO YOU HAVE SIX OR MORE DRINKS ON ONE OCCASION: NEVER
HOW OFTEN DO YOU HAVE A DRINK CONTAINING ALCOHOL: NEVER
HOW MANY STANDARD DRINKS CONTAINING ALCOHOL DO YOU HAVE ON A TYPICAL DAY: PATIENT DOES NOT DRINK
AUDIT-C TOTAL SCORE: 0
SKIP TO QUESTIONS 9-10: 1

## 2025-07-29 NOTE — PROGRESS NOTES
Subjective   Patient ID: Jeremi Monroy is a 35 y.o. male who presents for Follow-up (6 month follow up for HTN) and Nasal Congestion (Pt has been having on and off runny nose.  Not severe but does not seem to be going away).    HPI    Here for FU. States that he has been well as of late, with the exception of a consistently runny nose. Pt has complex PMH including HTN, HFpEF, Petic ulcer with GI bleed, hyponatremia, bipolar disorder with major depressive disorder, ETOH dependence in remission, chronic anemia and lekopenia. He follows with GI, Cards, Heme/onc, and psychiatry. Pt reports that he has had a runny nose that began this spring, he is unsure if he has seasonal allergies, and states that the nasal drainage is clear and sometimes causes a sore throat from the drip. Denies itchy eyes, itchy throat, sneezing.     #HTN/Hyponatremia   - io BP 91/58  - recent CMP (07/14/2025) 126, last CMP (01/03/2025) 128  - takes lisinopril 5 mg every day   - takes metoprolol succinate 25 mg BID (managed by cards)  - Tolerating medication well, without significant adverse side effect. Requesting refill at this time.     Denies fever/chills, nausea/vomiting, weight loss/gain, sleep disturbances, mood changes, psychiatric disturbances, fatigue, dizziness, weakness, confusion, headache, vision/hearing changes, numbness/tingling, cough, shortness of breath, chest pain, palpitations, abdominal pain, postprandial pain, bowel/bladder changes, skin/hair/nail changes, arthralgias, myalgias, rash, calf pain, claudication, lower extremity edema. Patient is doing clinically well today.     Review of Systems  All pertinent positive symptoms are included in the history of present illness.  All other systems have been reviewed and are negative and noncontributory to this patient's current ailments.    Medical History[1]  Surgical History[2]  Social History[3]  Family History[4]  Allergies[5]    There is no immunization history on file for  "this patient.  Current Outpatient Medications   Medication Instructions   • ferrous sulfate 325 mg (65 mg elemental) tablet 1 tablet, oral, Daily with breakfast   • fluticasone (Flonase) 50 mcg/actuation nasal spray 1 spray, Each Nostril, Daily, Shake gently. Before first use, prime pump. After use, clean tip and replace cap.   • lisinopril 2.5 mg, oral, Daily (0630)   • loratadine (CLARITIN) 10 mg, oral, Nightly PRN   • metoprolol succinate XL (TOPROL-XL) 25 mg, oral, 2 times daily   • mirtazapine (REMERON) 15 mg, Nightly   • multivitamin tablet 1 tablet, Daily   • omeprazole (PRILOSEC) 40 mg, oral, Daily before breakfast, Do not crush or chew.   • QUEtiapine (SEROQUEL) 50 mg, Nightly   • thiamine (VITAMIN B-1) 50 mg, Daily     Objective   Visit Vitals  BP 91/58 (BP Location: Right arm, Patient Position: Sitting, BP Cuff Size: Adult)   Pulse 58   Temp 35.8 °C (96.4 °F) (Temporal)   Resp 16   Ht 1.803 m (5' 11\")   Wt (!) 44.5 kg (98 lb)   SpO2 97%   BMI 13.67 kg/m²   Smoking Status Never   BSA 1.49 m²     Office Visit on 07/14/2025   Component Date Value   • WBC 07/14/2025 3.7 (L)    • nRBC 07/14/2025     • RBC 07/14/2025 3.42 (L)    • Hemoglobin 07/14/2025 11.1 (L)    • Hematocrit 07/14/2025 32.7 (L)    • MCV 07/14/2025 96    • MCH 07/14/2025 32.5    • MCHC 07/14/2025 33.9    • RDW 07/14/2025 13.6    • Platelets 07/14/2025 160    • Neutrophils % 07/14/2025 62.4    • Immature Granulocytes %,* 07/14/2025 0.0    • Lymphocytes % 07/14/2025 21.3    • Monocytes % 07/14/2025 13.9    • Eosinophils % 07/14/2025 1.9    • Basophils % 07/14/2025 0.5    • Neutrophils Absolute 07/14/2025 2.29    • Immature Granulocytes Ab* 07/14/2025 0.00    • Lymphocytes Absolute 07/14/2025 0.78 (L)    • Monocytes Absolute 07/14/2025 0.51    • Eosinophils Absolute 07/14/2025 0.07    • Basophils Absolute 07/14/2025 0.02    • Glucose 07/14/2025 86    • Sodium 07/14/2025 126 (L)    • Potassium 07/14/2025 4.4    • Chloride 07/14/2025 94 (L)    • " Bicarbonate 07/14/2025 29    • Anion Gap 07/14/2025 7 (L)    • Urea Nitrogen 07/14/2025 15    • Creatinine 07/14/2025 0.54    • eGFR 07/14/2025 >90    • Calcium 07/14/2025 9.3    • Albumin 07/14/2025 4.7    • Alkaline Phosphatase 07/14/2025 140 (H)    • Total Protein 07/14/2025 7.9    • AST 07/14/2025 18    • Bilirubin, Total 07/14/2025 0.5    • ALT 07/14/2025 24    • Ferritin 07/14/2025 122    • Folate, Serum 07/14/2025 >24.0    • Iron 07/14/2025 82    • UIBC 07/14/2025 284    • TIBC 07/14/2025 366    • % Saturation 07/14/2025 22 (L)    • Vitamin B12 07/14/2025 >2,000 (H)    • Retic % 07/14/2025 1.4    • Retic Absolute 07/14/2025 0.047    • Reticulocyte Hemoglobin 07/14/2025 39 (H)    • Immature Retic fraction 07/14/2025 4.9    • HIV 1/2 Antigen/Antibody* 07/14/2025 Nonreactive    • Thyroid Stimulating Horm* 07/14/2025 1.89    • Hepatitis B Surface AG 07/14/2025 Nonreactive    • Hepatitis A  AB- IgM 07/14/2025 Nonreactive    • Hepatitis B Core AB; IgM 07/14/2025 Nonreactive    • Hepatitis C AB 07/14/2025 Nonreactive    • EBV VCA, IgG  07/14/2025 Positive (A)    • EBV VCA, IgM  07/14/2025 Positive (A)    • EBV Early Antigen Antibo* 07/14/2025 Negative    • EBV Nuclear Antigen Anti* 07/14/2025 Positive (A)      Physical Exam  CONSTITUTIONAL - emaciated, in no acute distress  SKIN - normal skin color and pigmentation, normal skin turgor without rash, lesions, or nodules visualized  HEAD - no trauma, normocephalic  EYES - pupils are equal and reactive to light, extraocular muscles are intact, and normal external exam  ENT - uvula midline, normal tongue movement and throat normal, no exudate, nasal passage without discharge and patent  NECK - supple without rigidity, no neck mass was observed, no thyromegaly or thyroid nodules  CHEST - clear to auscultation, no wheezing, no crackles and no rales, good effort  CARDIAC - regular rate and regular rhythm, no skipped beats, no murmur  ABDOMEN - scaphoid, no organomegaly,  soft, nontender, normal bowel sounds  EXTREMITIES - no obvious or evident edema, no obvious or evident deformities  NEUROLOGICAL - alert, oriented and no focal signs  PSYCHIATRIC - alert, pleasant and cordial, age-appropriate  IMMUNOLOGIC - no cervical lymphadenopathy     Assessment/Plan     Here for follow up. Pt's runny nose likely due to allergic rhinitis, will start flonase and claritin. HTN is consistently low, will decrease lisinopril from 5 mg every day to 2.5 mg every day, pt is chronically hyponatremic, recent CMP (07/14/2025) Na 126, last CMP (01/03/2025) Na 128 on. Advised pt on importance of continued follow up with GI, Heme/onc, cards, and psych. Encouraged pt on regular weight bearing exercise, and to maintain healthy diet. Pt has scheduled upcoming appts with GI, Cards, and Heme/onc over the next couple of months.   Plan as follows.    #HTN/Hyponatremia  - stable  - decrease lisinopril to 2.5 mg every day from 5 mg every day   - cont metoprolol 25 mg BID    #Allergic Rhinitis  - begin flonase once daily  - begin claritin 10 mg nightly PRN    Rtc in six months for HM/FU  Bw as bellow to be completed prior to next in office visit.   Cont all other medications as prescribed, cont to follow up with GI, cards, heme/onc, and psych.    Problem List Items Addressed This Visit          Cardiac and Vasculature    Hypertension    Relevant Medications    lisinopril 2.5 mg tablet       Endocrine/Metabolic    Hyponatremia    Relevant Medications    lisinopril 2.5 mg tablet       Mental Health    Bipolar II disorder (Multi)    Well controlled, cont following with psych           Other Visit Diagnoses         Allergic rhinitis, unspecified seasonality, unspecified trigger    -  Primary    Relevant Medications    fluticasone (Flonase) 50 mcg/actuation nasal spray    loratadine (Claritin) 10 mg tablet                     [1]  Past Medical History:  Diagnosis Date   • (HFpEF) heart failure with preserved ejection  fraction    • Anxiety and depression    • History of ETOH abuse    • HTN (hypertension)    • Seizure disorder (Multi)     alcoholic   [2]  History reviewed. No pertinent surgical history.  [3]  Social History  Tobacco Use   • Smoking status: Never   • Smokeless tobacco: Never   Vaping Use   • Vaping status: Never Used   Substance Use Topics   • Alcohol use: Not Currently   • Drug use: Never   [4]  Family History  Problem Relation Name Age of Onset   • No Known Problems Mother     • Hypertension Father Arsh Monroy    • Colon cancer Father Arsh Monroy    • Cancer Father Arsh Monroy    • Alcohol abuse Maternal Grandfather Joshua    [5]  No Known Allergies

## 2025-08-08 DIAGNOSIS — I10 HYPERTENSION, UNSPECIFIED TYPE: ICD-10-CM

## 2025-08-12 RX ORDER — METOPROLOL SUCCINATE 25 MG/1
25 TABLET, EXTENDED RELEASE ORAL 2 TIMES DAILY
Qty: 180 TABLET | Refills: 3 | Status: SHIPPED | OUTPATIENT
Start: 2025-08-12

## 2025-09-04 ENCOUNTER — APPOINTMENT (OUTPATIENT)
Facility: CLINIC | Age: 35
End: 2025-09-04
Payer: COMMERCIAL

## 2025-09-04 ASSESSMENT — PAIN SCALES - GENERAL: PAINLEVEL_OUTOF10: 0-NO PAIN

## 2025-09-05 LAB
CREAT SERPL-MCNC: 0.55 MG/DL (ref 0.6–1.26)
EGFRCR SERPLBLD CKD-EPI 2021: 134 ML/MIN/1.73M2

## 2025-09-08 ENCOUNTER — TUMOR BOARD CONFERENCE (OUTPATIENT)
Dept: HEMATOLOGY/ONCOLOGY | Facility: HOSPITAL | Age: 35
End: 2025-09-08
Payer: MEDICARE

## 2026-01-21 ENCOUNTER — APPOINTMENT (OUTPATIENT)
Dept: PRIMARY CARE | Facility: CLINIC | Age: 36
End: 2026-01-21
Payer: MEDICARE